# Patient Record
Sex: MALE | Race: WHITE
[De-identification: names, ages, dates, MRNs, and addresses within clinical notes are randomized per-mention and may not be internally consistent; named-entity substitution may affect disease eponyms.]

---

## 2020-03-11 ENCOUNTER — HOSPITAL ENCOUNTER (INPATIENT)
Dept: HOSPITAL 97 - 4TH | Age: 70
LOS: 15 days | Discharge: HOME HEALTH | DRG: 871 | End: 2020-03-26
Attending: INTERNAL MEDICINE | Admitting: INTERNAL MEDICINE
Payer: COMMERCIAL

## 2020-03-11 VITALS — BODY MASS INDEX: 45.6 KG/M2

## 2020-03-11 DIAGNOSIS — E87.1: ICD-10-CM

## 2020-03-11 DIAGNOSIS — E83.42: ICD-10-CM

## 2020-03-11 DIAGNOSIS — Z79.52: ICD-10-CM

## 2020-03-11 DIAGNOSIS — Z79.899: ICD-10-CM

## 2020-03-11 DIAGNOSIS — C67.9: ICD-10-CM

## 2020-03-11 DIAGNOSIS — Z92.3: ICD-10-CM

## 2020-03-11 DIAGNOSIS — D62: ICD-10-CM

## 2020-03-11 DIAGNOSIS — A41.9: Primary | ICD-10-CM

## 2020-03-11 DIAGNOSIS — N18.3: ICD-10-CM

## 2020-03-11 DIAGNOSIS — E87.6: ICD-10-CM

## 2020-03-11 DIAGNOSIS — I13.10: ICD-10-CM

## 2020-03-11 DIAGNOSIS — N39.0: ICD-10-CM

## 2020-03-11 DIAGNOSIS — B95.62: ICD-10-CM

## 2020-03-11 DIAGNOSIS — E78.2: ICD-10-CM

## 2020-03-11 DIAGNOSIS — L03.116: ICD-10-CM

## 2020-03-11 DIAGNOSIS — E66.01: ICD-10-CM

## 2020-03-11 DIAGNOSIS — R65.20: ICD-10-CM

## 2020-03-11 DIAGNOSIS — N17.0: ICD-10-CM

## 2020-03-11 DIAGNOSIS — M86.9: ICD-10-CM

## 2020-03-11 DIAGNOSIS — C61: ICD-10-CM

## 2020-03-11 DIAGNOSIS — I48.0: ICD-10-CM

## 2020-03-11 DIAGNOSIS — R00.0: ICD-10-CM

## 2020-03-11 LAB
ALBUMIN SERPL BCP-MCNC: 3 G/DL (ref 3.4–5)
ALP SERPL-CCNC: 77 U/L (ref 45–117)
ALT SERPL W P-5'-P-CCNC: 28 U/L (ref 12–78)
AST SERPL W P-5'-P-CCNC: 29 U/L (ref 15–37)
BLD SMEAR INTERP: (no result)
BUN BLD-MCNC: 33 MG/DL (ref 7–18)
CKMB CREATINE KINASE MB: 1.2 NG/ML (ref 0.3–3.6)
GLUCOSE SERPLBLD-MCNC: 100 MG/DL (ref 74–106)
HCT VFR BLD CALC: 35.1 % (ref 39.6–49)
LYMPHOCYTES # SPEC AUTO: 0.4 K/UL (ref 0.7–4.9)
MAGNESIUM SERPL-MCNC: 2 MG/DL (ref 1.8–2.4)
MORPHOLOGY BLD-IMP: (no result)
PMV BLD: 7.6 FL (ref 7.6–11.3)
POTASSIUM SERPL-SCNC: 3.1 MMOL/L (ref 3.5–5.1)
RBC # BLD: 3.84 M/UL (ref 4.33–5.43)
TROPONIN I: 0.03 NG/ML (ref 0–0.04)

## 2020-03-11 PROCEDURE — 87340 HEPATITIS B SURFACE AG IA: CPT

## 2020-03-11 PROCEDURE — 86430 RHEUMATOID FACTOR TEST QUAL: CPT

## 2020-03-11 PROCEDURE — 86317 IMMUNOASSAY INFECTIOUS AGENT: CPT

## 2020-03-11 PROCEDURE — 83735 ASSAY OF MAGNESIUM: CPT

## 2020-03-11 PROCEDURE — 87077 CULTURE AEROBIC IDENTIFY: CPT

## 2020-03-11 PROCEDURE — 86021 WBC ANTIBODY IDENTIFICATION: CPT

## 2020-03-11 PROCEDURE — 86803 HEPATITIS C AB TEST: CPT

## 2020-03-11 PROCEDURE — 85014 HEMATOCRIT: CPT

## 2020-03-11 PROCEDURE — 84443 ASSAY THYROID STIM HORMONE: CPT

## 2020-03-11 PROCEDURE — 84145 PROCALCITONIN (PCT): CPT

## 2020-03-11 PROCEDURE — 84132 ASSAY OF SERUM POTASSIUM: CPT

## 2020-03-11 PROCEDURE — 71045 X-RAY EXAM CHEST 1 VIEW: CPT

## 2020-03-11 PROCEDURE — 93306 TTE W/DOPPLER COMPLETE: CPT

## 2020-03-11 PROCEDURE — 86038 ANTINUCLEAR ANTIBODIES: CPT

## 2020-03-11 PROCEDURE — 87075 CULTR BACTERIA EXCEPT BLOOD: CPT

## 2020-03-11 PROCEDURE — 84100 ASSAY OF PHOSPHORUS: CPT

## 2020-03-11 PROCEDURE — 36415 COLL VENOUS BLD VENIPUNCTURE: CPT

## 2020-03-11 PROCEDURE — 81001 URINALYSIS AUTO W/SCOPE: CPT

## 2020-03-11 PROCEDURE — 84244 ASSAY OF RENIN: CPT

## 2020-03-11 PROCEDURE — 82274 ASSAY TEST FOR BLOOD FECAL: CPT

## 2020-03-11 PROCEDURE — 93970 EXTREMITY STUDY: CPT

## 2020-03-11 PROCEDURE — 88304 TISSUE EXAM BY PATHOLOGIST: CPT

## 2020-03-11 PROCEDURE — 84156 ASSAY OF PROTEIN URINE: CPT

## 2020-03-11 PROCEDURE — 82533 TOTAL CORTISOL: CPT

## 2020-03-11 PROCEDURE — 86160 COMPLEMENT ANTIGEN: CPT

## 2020-03-11 PROCEDURE — 87389 HIV-1 AG W/HIV-1&-2 AB AG IA: CPT

## 2020-03-11 PROCEDURE — 82570 ASSAY OF URINE CREATININE: CPT

## 2020-03-11 PROCEDURE — 85018 HEMOGLOBIN: CPT

## 2020-03-11 PROCEDURE — 97116 GAIT TRAINING THERAPY: CPT

## 2020-03-11 PROCEDURE — 71046 X-RAY EXAM CHEST 2 VIEWS: CPT

## 2020-03-11 PROCEDURE — 86705 HEP B CORE ANTIBODY IGM: CPT

## 2020-03-11 PROCEDURE — 84484 ASSAY OF TROPONIN QUANT: CPT

## 2020-03-11 PROCEDURE — 83605 ASSAY OF LACTIC ACID: CPT

## 2020-03-11 PROCEDURE — 87086 URINE CULTURE/COLONY COUNT: CPT

## 2020-03-11 PROCEDURE — 82550 ASSAY OF CK (CPK): CPT

## 2020-03-11 PROCEDURE — 84550 ASSAY OF BLOOD/URIC ACID: CPT

## 2020-03-11 PROCEDURE — 76770 US EXAM ABDO BACK WALL COMP: CPT

## 2020-03-11 PROCEDURE — 80048 BASIC METABOLIC PNL TOTAL CA: CPT

## 2020-03-11 PROCEDURE — 97110 THERAPEUTIC EXERCISES: CPT

## 2020-03-11 PROCEDURE — 97161 PT EVAL LOW COMPLEX 20 MIN: CPT

## 2020-03-11 PROCEDURE — 87804 INFLUENZA ASSAY W/OPTIC: CPT

## 2020-03-11 PROCEDURE — 84165 PROTEIN E-PHORESIS SERUM: CPT

## 2020-03-11 PROCEDURE — 82553 CREATINE MB FRACTION: CPT

## 2020-03-11 PROCEDURE — 82088 ASSAY OF ALDOSTERONE: CPT

## 2020-03-11 PROCEDURE — 76857 US EXAM PELVIC LIMITED: CPT

## 2020-03-11 PROCEDURE — 97530 THERAPEUTIC ACTIVITIES: CPT

## 2020-03-11 PROCEDURE — 87186 SC STD MICRODIL/AGAR DIL: CPT

## 2020-03-11 PROCEDURE — 83520 IMMUNOASSAY QUANT NOS NONAB: CPT

## 2020-03-11 PROCEDURE — 86706 HEP B SURFACE ANTIBODY: CPT

## 2020-03-11 PROCEDURE — 93005 ELECTROCARDIOGRAM TRACING: CPT

## 2020-03-11 PROCEDURE — 87070 CULTURE OTHR SPECIMN AEROBIC: CPT

## 2020-03-11 PROCEDURE — 80053 COMPREHEN METABOLIC PANEL: CPT

## 2020-03-11 PROCEDURE — 87088 URINE BACTERIA CULTURE: CPT

## 2020-03-11 PROCEDURE — 87040 BLOOD CULTURE FOR BACTERIA: CPT

## 2020-03-11 PROCEDURE — 87205 SMEAR GRAM STAIN: CPT

## 2020-03-11 PROCEDURE — 80202 ASSAY OF VANCOMYCIN: CPT

## 2020-03-11 PROCEDURE — 85025 COMPLETE CBC W/AUTO DIFF WBC: CPT

## 2020-03-11 PROCEDURE — 36569 INSJ PICC 5 YR+ W/O IMAGING: CPT

## 2020-03-11 RX ADMIN — CEFTRIAXONE SCH MLS: 1 INJECTION, SOLUTION INTRAVENOUS at 22:15

## 2020-03-11 RX ADMIN — ACETAMINOPHEN PRN MG: 500 TABLET, FILM COATED ORAL at 22:35

## 2020-03-11 NOTE — RAD REPORT
EXAM DESCRIPTION:  Rosa Horta (2 Views)3/11/2020 9:57 pm

 

CLINICAL HISTORY:  Sepsis

 

COMPARISON:  None

 

FINDINGS:   The lungs appear clear of acute infiltrate. The heart is borderline enlarged

 

IMPRESSION:   No acute abnormalities displayed

## 2020-03-12 LAB
BUN BLD-MCNC: 37 MG/DL (ref 7–18)
GLUCOSE SERPLBLD-MCNC: 93 MG/DL (ref 74–106)
HCT VFR BLD CALC: 30.2 % (ref 39.6–49)
LYMPHOCYTES # SPEC AUTO: 0.3 K/UL (ref 0.7–4.9)
MAGNESIUM SERPL-MCNC: 1.8 MG/DL (ref 1.8–2.4)
PMV BLD: 7.9 FL (ref 7.6–11.3)
POTASSIUM SERPL-SCNC: 2.7 MMOL/L (ref 3.5–5.1)
RBC # BLD: 3.31 M/UL (ref 4.33–5.43)
UA COMPLETE W REFLEX CULTURE PNL UR: (no result)
URINE COARSE GRANULAR CASTS: (no result) /LPF
URINE UROTHELIAL CELLS: (no result) /HPF

## 2020-03-12 RX ADMIN — SODIUM CHLORIDE SCH MLS: 0.9 INJECTION, SOLUTION INTRAVENOUS at 18:41

## 2020-03-12 RX ADMIN — POTASSIUM CHLORIDE SCH MLS: 200 INJECTION, SOLUTION INTRAVENOUS at 11:11

## 2020-03-12 RX ADMIN — CEFTRIAXONE SCH MLS: 1 INJECTION, SOLUTION INTRAVENOUS at 11:05

## 2020-03-12 RX ADMIN — DOXYCYCLINE SCH MLS: 100 INJECTION, POWDER, LYOPHILIZED, FOR SOLUTION INTRAVENOUS at 09:00

## 2020-03-12 RX ADMIN — HYDROCODONE BITARTRATE AND ACETAMINOPHEN PRN TAB: 5; 325 TABLET ORAL at 16:22

## 2020-03-12 RX ADMIN — ACETAMINOPHEN PRN MG: 500 TABLET, FILM COATED ORAL at 04:44

## 2020-03-12 RX ADMIN — HYDROCODONE BITARTRATE AND ACETAMINOPHEN PRN TAB: 5; 325 TABLET ORAL at 20:45

## 2020-03-12 RX ADMIN — SODIUM CHLORIDE SCH: 0.9 INJECTION, SOLUTION INTRAVENOUS at 08:00

## 2020-03-12 RX ADMIN — POTASSIUM CHLORIDE SCH MLS: 200 INJECTION, SOLUTION INTRAVENOUS at 11:05

## 2020-03-12 RX ADMIN — SODIUM CHLORIDE SCH MLS: 0.9 INJECTION, SOLUTION INTRAVENOUS at 00:07

## 2020-03-12 RX ADMIN — POTASSIUM CHLORIDE SCH MLS: 200 INJECTION, SOLUTION INTRAVENOUS at 16:22

## 2020-03-12 RX ADMIN — ENOXAPARIN SODIUM SCH MG: 30 INJECTION SUBCUTANEOUS at 18:41

## 2020-03-12 RX ADMIN — CEFTRIAXONE SCH MLS: 1 INJECTION, SOLUTION INTRAVENOUS at 22:07

## 2020-03-12 RX ADMIN — HYDROCODONE BITARTRATE AND ACETAMINOPHEN PRN TAB: 5; 325 TABLET ORAL at 11:10

## 2020-03-12 RX ADMIN — DOXYCYCLINE SCH MLS: 100 INJECTION, POWDER, LYOPHILIZED, FOR SOLUTION INTRAVENOUS at 22:07

## 2020-03-12 RX ADMIN — ACETAMINOPHEN PRN MG: 500 TABLET, FILM COATED ORAL at 23:45

## 2020-03-12 RX ADMIN — SODIUM CHLORIDE SCH MLS: 0.9 INJECTION, SOLUTION INTRAVENOUS at 22:07

## 2020-03-12 RX ADMIN — SODIUM CHLORIDE SCH MLS: 0.9 INJECTION, SOLUTION INTRAVENOUS at 04:47

## 2020-03-12 NOTE — HP
Date of Admission:  2020



Chief Complaint:  Fever, chills, feeling weak.



History Of Present Illness:  This is a 69-year-old pleasant male patient who 
takes multiple antihypertensive medications, has chronic leg edema.  He called 
office today as he was not feeling good at all and reported that his 
temperature was 102.3, so he was asked to come and see me.  He denies any cough
, cold, congestion, not coughing up any mucus except says that yesterday he had 
runny nose for about 4-5 hours.  He started to have this fever as of Friday of 
last week and his maximum temperature was 102.3 degree Fahrenheit last night.  
He also reports that in the last few days, his left leg swelling has gotten 
worse and it is more than usual.  He has history of prostate cancer and he had 
radiation therapy for prostate cancer and says that ever since that time, this 
was few years ago, he has occasional burning sensation on urination, but that 
has gotten worse in last few days.  Denies any abdominal pain, nausea, 
vomiting.  No constipation.  No diarrhea.  No shortness of breath.  Today, he 
started to have profuse sweating.  When he came to office when I examined him, 
he appeared extremely pale.  His appetite is poor and has generalized weakness.
  After I evaluated him, he was admitted to the hospital and I was concerned 
about possibility of sepsis.



Allergies:  NO KNOWN ALLERGIES.



Medications:  Amlodipine 5 mg 2 times a day, aspirin 81 mg daily, clonidine 0.3 
mg 3 times a day, hydralazine 50 mg p.o. 2 times a day, hydrochlorothiazide 
12.5 mg p.o. daily, lisinopril 20 mg p.o. 2 times a day, metoprolol tartrate 
100 mg p.o. 2 times a day, tamsulosin 0.4 mg p.o. daily.



Review of Systems:

Constitutional:  As mentioned above. 

Genitourinary:  As mentioned above. 

Musculoskeletal:  Chronic knee joint pain, unchanged. 

Dermatology:  As mentioned above. 

All other systems reviewed and negative.



Past Medical History:  Significant for goiter, hypertension, mixed 
hyperlipidemia, prostate cancer, erectile dysfunction, lymphedema of legs, 
osteoarthritis.



Past Surgical History:  Partial thyroidectomy in , due to goiter and this 
was in form of removal of left thyroid lobe.



Family History:  Father , had coronary artery disease, hypertension.  
Mother , had Parkinson disease.



Social History:  Negative for smoking.  Occasional use of alcohol.



Physical Examination:

Vital Signs:  Blood pressure was 99/67 at office, repeat blood pressure was 100/
60, checked manually; pulse 83; temperature 98.2.  Patient had taken Tylenol 
and Motrin today for his fever.  Respiratory rate 15, weight 328 pounds, height 
71 inches. 

General:  Patient is awake, alert, and oriented, not in distress, appears very 
weak and pale. 

HEENT:  Head atraumatic, normocephalic.  Conjunctivae nonerythematous.  Sclerae 
white.  Mouth, no thrush or edema noted.  Ears/Nose, no mass, lesion, discharge 
noted. 

Neck:  Supple. No JVD, lymph nodes, bruit, thyromegaly noted. 

Lungs:  Bilateral good equal air entry. Clear to auscultation. No rhonchi.  No 
rales. 

Heart:  Normal heart sounds, no murmur or gallop. 

Abdomen:  Soft, bowel sounds normal. No guarding, rigidity, tenderness, mass, 
hepatosplenomegaly, distention, or bruit noted. 

Extremities:  Presence of bilateral leg edema. 

Skin:  Skin examination of the left leg between knee and foot shows pink, warm 
skin, has some superficial ulceration without any bleeding or discharge. 

Lymphatics:  No lymph node enlargement in neck, supraclavicular, 
infraclavicular region. 

Neuro:  No focal neurological deficit. 

Chest:  Unremarkable. 

External Genitalia:  Deferred. 

Rectal:  Deferred.



Laboratory Data:  Chest x-ray no acute changes.  Urinalysis shows leukocyte 
trace, nitrite negative, WBC <5, bacteria >50, protein 1+.  Lab shows WBC 10.2, 
hemoglobin 11.9, platelets 181, sodium 137, potassium 3.1, chloride 101, bicarb 
27, glucose 100, BUN 33, creatinine 2.87, liver function tests unremarkable, 
procalcitonin 11.08, lactate 1.7.



Impression:  

1.   Sepsis, rule out acute pyelonephritis.

2.   Cellulitis, left leg.

3.   Acute kidney failure.

4.   Mixed hyperlipidemia.

5.   Prostate cancer.

6.   Morbid obesity.

7.   Lymphedema, legs.

8.   Hypertension.



Plan:  Admit patient to hospital for further evaluation and management of this 
problem.  We will go ahead and admit him as an inpatient.  He is appropriate 
for inpatient and is expected to spend 2 midnights in hospital.  Blood culture 
was done.  We will follow up on blood culture and urine culture and we will 
also follow up on influenza test.  1 L of IV fluid bolus will be given soon as 
he gets admitted, IV antibiotics ceftriaxone will be given.  We will hold 
antihypertensive medication at this point.  We will start DVT prophylaxis per 
order using Lovenox.  Details and plan of treatment discussed with the patient.





MARY/KEVIN

DD:  2020 20:39:35   Voice ID:  [id number]

[email or web address]

## 2020-03-13 LAB
BUN BLD-MCNC: 49 MG/DL (ref 7–18)
GLUCOSE SERPLBLD-MCNC: 102 MG/DL (ref 74–106)
HCT VFR BLD CALC: 31.6 % (ref 39.6–49)
LYMPHOCYTES # SPEC AUTO: 0.5 K/UL (ref 0.7–4.9)
MORPHOLOGY BLD-IMP: (no result)
PMV BLD: 8 FL (ref 7.6–11.3)
POTASSIUM SERPL-SCNC: 3.1 MMOL/L (ref 3.5–5.1)
RBC # BLD: 3.47 M/UL (ref 4.33–5.43)

## 2020-03-13 PROCEDURE — 02HV33Z INSERTION OF INFUSION DEVICE INTO SUPERIOR VENA CAVA, PERCUTANEOUS APPROACH: ICD-10-PCS

## 2020-03-13 RX ADMIN — DOXYCYCLINE SCH MLS: 100 INJECTION, POWDER, LYOPHILIZED, FOR SOLUTION INTRAVENOUS at 21:19

## 2020-03-13 RX ADMIN — HYDROCODONE BITARTRATE AND ACETAMINOPHEN PRN TAB: 5; 325 TABLET ORAL at 05:23

## 2020-03-13 RX ADMIN — ENOXAPARIN SODIUM SCH MG: 30 INJECTION SUBCUTANEOUS at 16:05

## 2020-03-13 RX ADMIN — SODIUM CHLORIDE SCH MLS: 0.9 INJECTION, SOLUTION INTRAVENOUS at 21:19

## 2020-03-13 RX ADMIN — SODIUM CHLORIDE SCH MLS: 9 INJECTION, SOLUTION INTRAVENOUS at 12:38

## 2020-03-13 RX ADMIN — DOXYCYCLINE SCH MLS: 100 INJECTION, POWDER, LYOPHILIZED, FOR SOLUTION INTRAVENOUS at 08:37

## 2020-03-13 RX ADMIN — HYDROCODONE BITARTRATE AND ACETAMINOPHEN PRN TAB: 5; 325 TABLET ORAL at 14:34

## 2020-03-13 RX ADMIN — ACETAMINOPHEN PRN MG: 500 TABLET, FILM COATED ORAL at 22:21

## 2020-03-13 RX ADMIN — SODIUM CHLORIDE SCH: 0.9 INJECTION, SOLUTION INTRAVENOUS at 12:00

## 2020-03-13 RX ADMIN — HYDROCODONE BITARTRATE AND ACETAMINOPHEN PRN TAB: 5; 325 TABLET ORAL at 21:44

## 2020-03-13 RX ADMIN — TAMSULOSIN HYDROCHLORIDE SCH MG: 0.4 CAPSULE ORAL at 10:41

## 2020-03-13 NOTE — RAD REPORT
EXAM DESCRIPTION:  US - Renal Ultrasound-Complete - 3/13/2020 11:06 am

 

CLINICAL HISTORY:  . Acute renal failure

 

COMPARISON:  None.

 

FINDINGS:  The right kidney measures 12 cm with a normal echotexture.

 

The left kidney measures 14 cm with a normal echotexture. 3.3 centimeters cyst

 

Hydronephrosis is not seen.

 

IMPRESSION:  3.3 centimeter left renal cyst

## 2020-03-13 NOTE — RAD REPORT
EXAM DESCRIPTION:  US - Urinary Bladder - 3/13/2020 11:07 am

 

CLINICAL HISTORY:  Acute renal insufficiency

 

FINDINGS:  Prevoid bladder volume equals 42 cc

 

Postvoid bladder volume equals 7 cc

 

No ascites

 

IMPRESSION:  Prevoid bladder volume equals 42 cc

 

Postvoid bladder volume equals 7 cc

## 2020-03-13 NOTE — PN
Date of Progress Note:  03/13/2020



Subjective:  Patient was seen this morning for followup.  He was sitting at bedside in chair.  Overal
l, he looks a lot better today than yesterday and day before yesterday.  He had lot of leg pain yeste
rday in the left leg, but that has improved.  Denies any abdominal pain, nausea, vomiting.  No shortn
ess of breath.  Appetite has improved.



Objective:  Vital Signs:  Reviewed.  Blood pressure actually has improved now. 

HEENT:  Unremarkable. 

Lungs:  Clear to auscultation. 

Heart:  Heart sounds normal. 

Abdomen:  Soft, bowel sounds normal.  No guarding, rigidity, tenderness, or distention. 

Extremities:  Left leg has about grade 3 pedal edema.  Edema of the left leg has gotten worse compare
d to yesterday and cellulitis changes also has gotten worse today compared to yesterday and day befor
e yesterday.  Today, his left medial thigh almost entire left medial thigh has pink warm skin, which 
is new.  His intensity of redness of skin in the area between left knee and foot is lot worse today a
lso involving left foot.  There is some clear liquid type of discharge from the left dorsum foot and 
nurse was advised to go ahead and clean that area and send it for the culture.



Laboratory Data:  Today's blood work results reviewed.  White count is normal.  His procalcitonin is 
down to 4.  Yesterday, it was 6, day before yesterday it was 11.  Creatinine has gone up today to 3.4
 range, from the time of admission, which was day before yesterday.  Until yesterday morning, there w
as improvement and between yesterday morning and today creatinine has gone up.  His procalcitonin has
 steadily declined since the time of admission.



Impression:  

1.Cellulitis, left leg.

2.Acute kidney failure.

3.Rule out sepsis.

4.Hypertension.



Plan:  We will go ahead and discontinue ceftriaxone and doxycycline and start the patient on Levaquin
 as well as vancomycin as per order.  Consult pharmacy for vancomycin dose management.  IV fluid was 
ordered.  Patient was getting IV fluid at 100 mL/hour and 500 mL bolus was ordered and then we will c
ontinue IV fluid at 100 mL/hour.  Kidney and bladder ultrasound was ordered to be done this morning. 
 Results reviewed and it is normal.  Nephrology consultation was also ordered and nephrologist has se
en the patient and I have discussed details with the nephrologist.  I expect his renal function to im
prove hopefully starting tomorrow or day after tomorrow we might able to reach peak level today.  His
 renal failure I strongly suspect is result of probable acute kidney injury from hypotension and seps
is type of problem and all the details were discussed with the patient.  No need for any antihyperten
sive medication at this point yet.  PICC line was ordered as the patient is having poor IV access.  W
e will repeat blood work tomorrow morning.  Ambulation was encouraged.  Patient was advised to keep h
is leg elevated and continue DVT prophylaxis with Lovenox.





MARY/MODL

DD:  03/13/2020 20:02:11Voice ID:  [id number]

DT:  03/13/2020 22:17:04Report ID:  [id number]

## 2020-03-13 NOTE — RAD REPORT
EXAM DESCRIPTION:  Rosa Single View3/13/2020 11:23 am

 

CLINICAL HISTORY:  Chest pain

 

COMPARISON:  March 11, 2020

 

FINDINGS:   The lungs appear clear of acute infiltrate. The heart is mildly to moderately enlarged

 

IMPRESSION:   No acute abnormalities displayed

## 2020-03-13 NOTE — P.CNS
Date of Consult: 20


Reason for Consult: jacinto, hypokalmemia 


Requesting Physician: Elieser Hansen


Chief Complaint: fever, weakness


Allergies





No Known Allergies Allergy (Unverified 20 19:59)


 





Home Medications: 








Abiraterone Acetate [Zytiga] 1,000 mg PO DAILY 20 


Amlodipine Besylate 1 tab PO BID 20 


Ascorbate Calcium [Vitamin C] 1 tab PO DAILY 20 


Aspirin [Aspirin EC 81 MG] 1 tab PO BEDTIME 20 


Ca/D3/Mag Ox/Zinc//Tyler/Bor [Calcium 600-D3 Plus Caplet] 1 tab PO DAILY  


Cholecalciferol (Vitamin D3) [Vitamin D3] 1 cap PO DAILY 20 


Clonidine HCl [Catapres] 1 tab PO TID 20 


Hydralazine [Apresoline*] 25 mg PO BID 20 


Iron,Carb/Vit C/Vit B12/Folic [Iron 100 Plus Tablet] 1 tab PO DAILY 20 


Lisinopril [Zestril] 1 tab PO BID 20 


Metoprolol Tartrate 50 mg PO DAILY 20 


Montelukast Sodium 10 mg PO PRN 20 


Tamsulosin HCl 1 tab PO DAILY 20 


Ubidecarenone [Co Q10] 200 mg PO DAILY 20 


hydroCHLOROthiazide [Hydrochlorothiazide] 12.5 mg PO BID 20 


predniSONE [Prednisone*] 5 mg PO BID 20 








- Past Medical/Surgical History


Diabetic: No


-: Hypertension


-: Prostate CA, Radiation 45 days last Dec 18,2019


-: thyroidectomy





- Family History


  ** Father


Medical History: Heart disease





  ** Mother


Medical History: Other (see notes)


Notes: parkinson





  ** Brother


Medical History: Other (see notes)


Notes: sepsis





- Social History


Alcohol use: Yes


CD- Drugs: No


Caffeine use: Yes


Place of Residence: Home





Physical Examination














Temp Pulse Resp BP Pulse Ox


 


 97.3 F   106 H  16   134/84   98 


 


 20 08:00  20 08:00  20 08:00  20 08:00  20 08:00








Laboratory Data (last 24 hrs)





20 07:17: WBC 10.2  D, Hgb 10.7 L, Hct 31.6 L, Plt Count 150 L


20 05:14: Magnesium 1.9


20 05:14: Sodium 137, Potassium 3.1 L, BUN 49 H, Creatinine 3.42 H, 

Glucose 102


20 : Potassium Cancelled


20 21:31: Potassium 3.1 L








- Problems


(1) JACINTO (acute kidney injury)


Current Visit: Yes   Status: Acute   


Conclusions/Impression: 








History Of Present Illness:  


A 69-year-old with PMHx of HTN , prostate CA on Leupron? was on radiation 

therapy until 2019 and started last month on Zytiga


pt was sent fro fever and chills 


pt was started on Zytiga in med February


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 

cystitis


had runny nose for 3-4 hrs , with mild nausea and no vomiting 


he denied SOB , cough, diarrhea, chest pain, palpitation ,  


pt was taking Ibuprofen 3-4 times daily last 3-4 days








Allergies:  NO KNOWN ALLERGIES.





Medications:  Amlodipine 5 mg 2 times a day, aspirin 81 mg daily, clonidine 0.3 

mg 3 times a day, hydralazine 50 mg p.o. 2 times a day, hydrochlorothiazide 

12.5 mg p.o. daily, lisinopril 20 mg p.o. 2 times a day, metoprolol tartrate 

100 mg p.o. 2 times a day, tamsulosin 0.4 mg p.o. daily.





Review of Systems:


as in HPI 





Past Medical History:  


as in HPI 





Past Surgical History:  Partial thyroidectomy in , due to goiter and this 

was in form of removal of left thyroid lobe.





Family History:  Father , had coronary artery disease, hypertension.  

Mother , had Parkinson disease.





Social History:  Negative for smoking.  Occasional use of alcohol.

















 Physical exam 


general: AAOX3, NAD , obese


Neck; Supple, No elevated JVD 


hear: RRR, normal S1,2 no murmur or rub


Chest: CTAB, no rales or wheezes  


Abdomen: Soft , Nt 


Extremities Lt leg edema and erythema , with Lt leg trace edema 

















A/p 








JACINTO 


Cr ~1.0 at baseline 


possibly due to septic ATn vs Zytiga 


will cont IVF 


renal US no hydro 


will cont IV for now 


cont to hold HCTZ


will send for serology 


will send for UPC 





HTN 


BP controlled 





Bladder CA 


hold Zytiga 








Sepsis


monitor vanco level 


F/U cultures

## 2020-03-13 NOTE — PN
Date of Progress Note:  03/12/2020



Subjective:  The patient was seen for followup in the morning.  He actually looked better than yester
day.  He is complaining of lot of pain in his left leg and reported that Tylenol was not helping, so 
we did talk about some stronger pain medication and we will order that for him.  No nausea or vomitin
g.  No other new complaints reported overnight.



Objective:  Vital Signs:  Reviewed. 

HEENT:  Unremarkable. 

Lungs:  Clear to auscultation. 

Heart:  Sounds normal. 

Abdomen:  Soft.  Bowel sounds normal.  No guarding, rigidity, tenderness, or distention. 

Extremity:  Left leg redness of the skin between knee and foot is present and unchanged.  Leg is warm
 to touch.  No new findings noted.  Right leg is normal.



Laboratory Data:  White count 8.1, hemoglobin 10.3, platelets 148.  Procalcitonin level today is 6.16
, which is better from yesterday and yesterday it was 11.08.  Sodium 135, potassium 2.7, chloride 103
, bicarb 24, BUN 37, creatinine 2.61, glucose 93, magnesium 1.8.  Creatinine today is better, it is 2
.61.  Yesterday, it was 2.87.



Impression:  

1.Cellulitis, left leg.

2.Rule out sepsis.

3.Acute kidney failure.

4.Hypertension.

5.Hypokalemia.

6.Hyponatremia.



Plan:  We will go ahead and continue IV fluid per order.  We will also continue ceftriaxone that was 
started upon admission and we will add doxycycline.  Blood culture result pending.  Influenza test is
 negative.  The patient's procalcitonin has improved compared to yesterday.  IV fluid will be continu
ed.  We will continue Lovenox for DVT prophylaxis, and hydrocodone was ordered.  We will add doxycycl
ine 

as per order and I will see him tomorrow for followup.  We will not start any antihypertensive medica
tion yet.





MARY/MODL

DD:  03/13/2020 07:38:47Voice ID:  [id number]

DT:  03/13/2020 12:19:33Report ID:  [id number]

## 2020-03-14 LAB
BUN BLD-MCNC: 50 MG/DL (ref 7–18)
CREAT UR-SCNC: 52 MG/DL (ref 20–370)
GLUCOSE SERPLBLD-MCNC: 90 MG/DL (ref 74–106)
POTASSIUM SERPL-SCNC: 3.4 MMOL/L (ref 3.5–5.1)
PROT UR-MCNC: 106 MG/DL (ref ?–11.9)
TSH SERPL DL<=0.05 MIU/L-ACNC: 0.46 UIU/ML (ref 0.36–3.74)
URATE SERPL-MCNC: 5.9 MG/DL (ref 3.5–7.2)

## 2020-03-14 RX ADMIN — METOPROLOL TARTRATE SCH MG: 25 TABLET ORAL at 21:04

## 2020-03-14 RX ADMIN — SODIUM CHLORIDE SCH MLS: 0.9 INJECTION, SOLUTION INTRAVENOUS at 17:08

## 2020-03-14 RX ADMIN — HYDROCODONE BITARTRATE AND ACETAMINOPHEN PRN TAB: 5; 325 TABLET ORAL at 21:11

## 2020-03-14 RX ADMIN — METOPROLOL TARTRATE SCH MG: 25 TABLET ORAL at 09:49

## 2020-03-14 RX ADMIN — ENOXAPARIN SODIUM SCH MG: 30 INJECTION SUBCUTANEOUS at 17:07

## 2020-03-14 RX ADMIN — SODIUM CHLORIDE SCH MLS: 0.9 INJECTION, SOLUTION INTRAVENOUS at 07:32

## 2020-03-14 RX ADMIN — ACETAMINOPHEN PRN MG: 500 TABLET, FILM COATED ORAL at 22:30

## 2020-03-14 RX ADMIN — DOXYCYCLINE SCH MLS: 100 INJECTION, POWDER, LYOPHILIZED, FOR SOLUTION INTRAVENOUS at 08:51

## 2020-03-14 RX ADMIN — SODIUM CHLORIDE SCH MLS: 9 INJECTION, SOLUTION INTRAVENOUS at 22:30

## 2020-03-14 RX ADMIN — TAMSULOSIN HYDROCHLORIDE SCH MG: 0.4 CAPSULE ORAL at 07:26

## 2020-03-14 NOTE — PN
Date of Progress Note:  03/14/2020



Subjective:  Patient was seen this morning for followup.  No new complaints or problems reported by h
im.  Reported that he rested well last night.  Pain in his left leg is better when he has his leg katharine
vated, but notices more pain when he keeps his leg in a dependent position while sitting, standing, w
alking, etc.  Denies any shortness of breath.



Objective:  Vital Signs:  Reviewed. 

HEENT:  Unremarkable. 

Lungs:  Clear to auscultation. 

Heart:  Heart sounds normal. 

Abdomen:  Soft.  Bowel sounds normal.  No guarding, rigidity, tenderness, distention. 

Extremities:  Right leg, no edema.  Left leg has grade 3 pedal edema, which is unchanged from yesterd
ay.  Redness of skin of left thigh and left leg between knee and toes remain unchanged today and swel
ling remains unchanged today.  There are some superficial open areas on the lower and lateral leg and
 dorsum foot remains unchanged.



Laboratory Data:  Sodium 139, potassium 3.4, chloride 106, bicarb 24, BUN 50, creatinine 3.60, glucos
e 90.  Blood culture remains negative.



Impression:  

1.Acute kidney injury.

2.Cellulitis, left leg.

3.Hypertension.

4.Sinus tachycardia.



Plan:  Patient's heart rate remains in 120 to 130 range lot of times and on telemetry monitor it is s
inus tachycardia.  No evidence of atrial fibrillation.  We will restart his antihypertensive medicati
on, metoprolol that he normally takes at home and we will start it at 25 mg twice a day dose.  His cr
eatinine when he came in was 2.8 and it came down to 2.6, then yesterday went up to 3.4, and today it
 is 3.6.  Increase in creatinine in the last 24 hours is not as bad as previous 24 hours, so I hope t
hat we might have reached a peak level by today.  We will continue current antibiotics, IV fluids, re
peat blood work tomorrow, continue to follow with nephrologist, and I will see him tomorrow for follo
wup.  Continue Lovenox and prednisone per order.  Patient reported that he is on prednisone for the l
ast 3 weeks along with his chemotherapy medication started by Dr. Green.





MARY/MODL

DD:  03/14/2020 09:21:49Voice ID:  [id number]

DT:  03/14/2020 13:25:48Report ID:  [id number]

## 2020-03-14 NOTE — P.PN
Subjective


Date of Service: 20


Chief Complaint: fever, weakness





Subjective 


A 69-year-old with PMHx of HTN , prostate CA  was on radiation therapy until 2019 and started last month on Zytiga


pt was sent fro fever and chills 


pt was started on Zytiga in med February


in Er cr 2.6 and elevated to 3.6 








today 


feels better 


have tachycardia , BB restarted 


Cr slightly elevate d to 3.8, plateauing?


cont IVF for now 








Allergies:  NO KNOWN ALLERGIES.





Medications:  Amlodipine 5 mg 2 times a day, aspirin 81 mg daily, clonidine 0.3 

mg 3 times a day, hydralazine 50 mg p.o. 2 times a day, hydrochlorothiazide 

12.5 mg p.o. daily, lisinopril 20 mg p.o. 2 times a day, metoprolol tartrate 

100 mg p.o. 2 times a day, tamsulosin 0.4 mg p.o. daily.





Review of Systems:


as in HPI 





Past Medical History:  


as in HPI 





Past Surgical History:  Partial thyroidectomy in , due to goiter and this 

was in form of removal of left thyroid lobe.





Family History:  Father , had coronary artery disease, hypertension.  

Mother , had Parkinson disease.





Social History:  Negative for smoking.  Occasional use of alcohol.

















 Physical exam 


general: AAOX3, NAD , obese


Neck; Supple, No elevated JVD 


hear: tachycardia, regular rhythm  normal S1,2 no murmur or rub


Chest: CTAB, no rales or wheezes  


Abdomen: Soft , Nt 


Extremities Lt leg edema and erythema , with Lt leg trace edema 

















A/p 








JACINTO 


Cr ~1.0 at baseline


possibly due to septic ATn vs Zytiga 


will cont IVF 


renal US no hydro 


will cont IV for now 


cont to hold HCTZ


UPC 2.0 


F/U serology 





HTN 


BP controlled 





Bladder CA 


hold Zytiga 








Sepsis


monitor vanco level 


F/U cultures 

















Physical Examination





- Vital Signs


Temperature: 99 F


Blood Pressure: 123/68


Pulse: 125


Respirations: 16


Pulse Ox (%): 94





- Studies


Laboratory Data (last 24 hrs)





20 15:23: Potassium 3.7


20 13:00: Potassium Cancelled


20 07:15: Uric Acid 5.9, Phosphorus 2.8


20 05:47: Sodium 139, Potassium 3.4 L, BUN 50 H, Creatinine 3.60 H, 

Glucose 90





Microbiology Data (last 24 hrs): 








20 13:45   Wound - Left Foot   Gram Stain - Final


20 08:14   Blood  - Blood   Anaerobic Blood Culture - Final


20 22:10   Clean Catch Urine   [place] Count - Final


                            <10,000 CFU/ML.


20 22:10   Clean Catch Urine    - Final


                            MIXED [place].








Assessment And Plan





- Current Problems (Diagnosis)


(1) JACINTO (acute kidney injury)


Current Visit: Yes   Status: Acute

## 2020-03-15 LAB
BUN BLD-MCNC: 51 MG/DL (ref 7–18)
GLUCOSE SERPLBLD-MCNC: 97 MG/DL (ref 74–106)
HCT VFR BLD CALC: 26.7 % (ref 39.6–49)
LYMPHOCYTES # SPEC AUTO: 0.5 K/UL (ref 0.7–4.9)
MAGNESIUM SERPL-MCNC: 1.5 MG/DL (ref 1.8–2.4)
MAGNESIUM SERPL-MCNC: 2 MG/DL (ref 1.8–2.4)
PMV BLD: 8.2 FL (ref 7.6–11.3)
POTASSIUM SERPL-SCNC: 3.1 MMOL/L (ref 3.5–5.1)
POTASSIUM SERPL-SCNC: 3.8 MMOL/L (ref 3.5–5.1)
RBC # BLD: 2.94 M/UL (ref 4.33–5.43)

## 2020-03-15 RX ADMIN — SODIUM CHLORIDE SCH MLS: 0.9 INJECTION, SOLUTION INTRAVENOUS at 16:06

## 2020-03-15 RX ADMIN — ENOXAPARIN SODIUM SCH MG: 30 INJECTION SUBCUTANEOUS at 16:06

## 2020-03-15 RX ADMIN — SODIUM CHLORIDE SCH: 0.9 INJECTION, SOLUTION INTRAVENOUS at 14:00

## 2020-03-15 RX ADMIN — ACETAMINOPHEN PRN MG: 500 TABLET, FILM COATED ORAL at 16:06

## 2020-03-15 RX ADMIN — ACETAMINOPHEN PRN MG: 500 TABLET, FILM COATED ORAL at 02:16

## 2020-03-15 RX ADMIN — SODIUM CHLORIDE SCH MLS: 9 INJECTION, SOLUTION INTRAVENOUS at 21:47

## 2020-03-15 RX ADMIN — TAMSULOSIN HYDROCHLORIDE SCH MG: 0.4 CAPSULE ORAL at 08:06

## 2020-03-15 RX ADMIN — SODIUM CHLORIDE SCH MLS: 9 INJECTION, SOLUTION INTRAVENOUS at 10:27

## 2020-03-15 RX ADMIN — ACETAMINOPHEN PRN MG: 500 TABLET, FILM COATED ORAL at 21:47

## 2020-03-15 RX ADMIN — METOPROLOL TARTRATE SCH MG: 50 TABLET, FILM COATED ORAL at 21:48

## 2020-03-15 RX ADMIN — SODIUM CHLORIDE SCH MLS: 9 INJECTION, SOLUTION INTRAVENOUS at 09:43

## 2020-03-15 RX ADMIN — SODIUM CHLORIDE SCH MLS: 9 INJECTION, SOLUTION INTRAVENOUS at 17:25

## 2020-03-15 RX ADMIN — SODIUM CHLORIDE SCH MLS: 0.9 INJECTION, SOLUTION INTRAVENOUS at 04:35

## 2020-03-15 NOTE — P.PN
Subjective


Date of Service: 03/15/20


Chief Complaint: fever, weakness





Subjective 


A 69-year-old with PMHx of HTN , prostate CA  was on radiation therapy until 2019 and started last month on Zytiga


pt was sent fro fever and chills 


pt was started on Zytiga in med February


in Er cr 2.6 and elevated to 3.6 








today 


feels better , mild tachycardia 


Cr improving to 3.0 , ATN? resolving 


cont IVF 


wbc up to 15K, Abx adjusted by primary team, 


cont IVF for now 


will replace K, Mg replaced 








Allergies:  NO KNOWN ALLERGIES.





Medications:  Amlodipine 5 mg 2 times a day, aspirin 81 mg daily, clonidine 0.3 

mg 3 times a day, hydralazine 50 mg p.o. 2 times a day, hydrochlorothiazide 

12.5 mg p.o. daily, lisinopril 20 mg p.o. 2 times a day, metoprolol tartrate 

100 mg p.o. 2 times a day, tamsulosin 0.4 mg p.o. daily.





Review of Systems:


as in HPI 





Past Medical History:  


as in HPI 





Past Surgical History:  Partial thyroidectomy in , due to goiter and this 

was in form of removal of left thyroid lobe.





Family History:  Father , had coronary artery disease, hypertension.  

Mother , had Parkinson disease.





Social History:  Negative for smoking.  Occasional use of alcohol.

















 Physical exam 


general: AAOX3, NAD , obese


Neck; Supple, No elevated JVD 


hear: tachycardia, regular rhythm  normal S1,2 no murmur or rub


Chest: CTAB, no rales or wheezes  


Abdomen: Soft , Nt 


Extremities Lt leg edema and erythema , with Lt leg trace edema 

















A/p 








JACINTO 


Cr ~1.0 at baseline


possibly due to septic ATn vs Zytiga 


will cont IVF 


renal US no hydro 


will cont IV for now 


cont to hold HCTZ


UPC 2.0 


F/U serology 





HTN 


BP controlled 





Bladder CA 


hold Zytiga 








Sepsis


monitor vanco level 


F/U cultures 








tachycardia 


possibly due to sepsis vs reflex from holding BB


now restarted on BB 

















Physical Examination





- Vital Signs


Temperature: 97.7 F


Blood Pressure: 137/84


Pulse: 101


Respirations: 18


Pulse Ox (%): 98





- Studies


Laboratory Data (last 24 hrs)





03/15/20 03:45: Sodium 140, Potassium 3.1 L, BUN 51 H, Creatinine 3.00 H, 

Glucose 97, Magnesium 1.5 L


03/15/20 03:45: WBC 15.4 H D, Hgb 8.9 L, Hct 26.7 L D, Plt Count 159


20 15:23: Potassium 3.7





Microbiology Data (last 24 hrs): 








20 13:45   Wound - Left Foot   Gram Stain - Final


20 08:14   Blood  - Blood   Anaerobic Blood Culture - Final


20 22:10   Clean Catch Urine   [place] Count - Final


                            <10,000 CFU/ML.


20 22:10   Clean Catch Urine    - Final


                            MIXED [place].








Assessment And Plan





- Current Problems (Diagnosis)


(1) JACINTO (acute kidney injury)


Current Visit: Yes   Status: Acute

## 2020-03-15 NOTE — CON
Date of Consultation:  03/15/2020



Reason For Consultation:  New-onset atrial fibrillation.



History Of Present Illness:  Mr. Hensley is a 69-year-old male.  He is a patient of Dr. Hansen, has multip
le issues, but basically was admitted from Dr. Hansen's office with cellulitis, sepsis, severe lymphede
ma of the left leg, acute kidney failure, dyslipidemia, hypertension, morbid obesity.  He was being t
reated with Lovenox, Levaquin, prednisone, vancomycin, Vibramycin, as well as metoprolol and some of 
his home medication and while he was being treated, he had an episode yesterday of atrial fibrillatio
n with a rapid ventricular response that has resolved on its own.  He is today in normal sinus rhythm
.  He rarely feels any palpitation, but denied any symptoms with his atrial fibrillation.  He denied 
any chest pain, shortness of breath.  He denied any syncope.



Past Medical History:  As stated above.



Allergies:  NONE.



Review of Systems:

Positive for fever 102.3 when he came in.



Social History:  Unremarkable.



Family History:  Positive for coronary artery disease.



Medications:  At home include clonidine, metoprolol, aspirin, hydralazine, prednisone, Flomax, hydroc
hlorothiazide as well as lisinopril and a medicine called Zytiga.



Physical Examination:

Vital Signs:  Stable.  He was afebrile.  He was in a sinus rhythm now. 

HEENT:  Negative. 

Neck:  Supple.  No bruit. 

Chest:  Clear. 

Cardiac:  Revealed a regular rhythm and rate.  No murmurs, gallops, or rubs. 

Abdomen:  Obese, but benign. 

Extremities:  Revealed normal right lower extremity, but the left lower extremity had cellulitis, sev
ere edema, erythema.  His edema extended to his left thigh.



Diagnostic Data:  His creatinine was 3.0.  White count was 15,000, hemoglobin is 8.9.  Procalcitonin 
was 4.32.  EKG now showed normal sinus rhythm with nonspecific changes.



Impression And Plan:  Single episode of atrial fibrillation, now has resolved.  Patient has all the r
isk factors for atrial fibrillation.  He has morbid obesity.  He has hypertension, dyslipidemia.  He 
came in with acute kidney failure, followed by Nephrology.  He is being treated for cellulitis, sepsi
s as well as lymphedema.  I suggest we do not put him on anticoagulation as far as oral anticoagulati
on for now just from having one episode.  I do agree with the Lovenox and the metoprolol along with h
is other medication that he is getting for his infections.  I would like him to get a 2D echocardiogr
am tomorrow.  If the echocardiogram is normal, I would suffice with aspirin and beta-blockers.  It ma
y be worth getting an event monitor on him down the road to see if he is having paroxysmal atrial fib
rillation and we can make an arrangement for that as an outpatient.  I will discuss the case further 
with Dr. Hansen.  His other problems including hypertension and dyslipidemia are well controlled.  His 
kidney function is being followed closely by Nephrology.  I will continue to follow him.





NB/KEVIN

DD:  03/15/2020 10:35:41Voice ID:  [id number]

DT:  03/15/2020 14:39:34Report ID:  [id number]

## 2020-03-15 NOTE — EKG
Test Date:    2020-03-15               Test Time:    03:25:22

Technician:   RT BARBER                                   

                                                     

MEASUREMENT RESULTS:                                       

Intervals:                                           

Rate:         137                                    

PA:                                                  

QRSD:         82                                     

QT:           256                                    

QTc:          386                                    

Axis:                                                

P:                                                   

PA:                                                  

QRS:          31                                     

T:            82                                     

                                                     

INTERPRETIVE STATEMENTS:                                       

                                                     

Atrial fibrillation with rapid ventricular response

Abnormal ECG

No previous ECG available for comparison



Electronically Signed On 03-15-20 09:12:43 CDT by Jamison Myers

## 2020-03-15 NOTE — PN
Date of Progress Note:  03/15/2020



Subjective:  Patient was seen this morning for followup.  He was lying in bed, not in any distress.  
He actually feels better and looked a lot better today than last couple of days.  Vital signs reviewe
d.  During nighttime, he had atrial fibrillation with rapid ventricular rate and he did convert to si
nus rhythm after IV metoprolol, which was given early this morning.  When I saw him this morning, he 
was in sinus rhythm.  He still has lot of left leg pain, especially worse when he sits or stands with
 the leg in the dependent position.



Objective:  Vital Signs:  Reviewed. 

HEENT:  Unremarkable. 

Lungs:  Clear to auscultation. 

Heart:  Sounds normal. 

Abdomen:  Soft.  Bowel sounds normal.  No guarding, rigidity, tenderness, distention. 

Extremities:  Right leg, no edema.  Left leg, edema remains unchanged and redness distribution from t
high and lower leg remains unchanged, but intensity of redness is better today compared to yesterday 
and instead of hard pink appearance of the skin, now it is brownish color in lot of areas.  Distribut
ion remains the same from thigh and lower leg area.



Laboratory Data:  White count has gone up to 15.4 today, hemoglobin 8.9, platelets 159.  Sodium 140, 
potassium 3.1, chloride 109, bicarb 23, BUN 51, creatinine 3, glucose 97, magnesium 1.5.  Procalciton
in today 4.32.



Impression:  

1.Cellulitis, left leg.

2.Acute kidney injury.

3.Hypokalemia.

4.Hypomagnesemia.

5.Anemia.

6.Hypertension.

7.Paroxysmal atrial fibrillation.



Plan:  We will go ahead and replace electrolyte per protocol.  Metoprolol dose was increased to 50 mg
 twice a day.  Cardiology consultation was requested.  We will give anticoagulation therapy per order
.  I have changed antibiotics today and we will give IV meropenem and IV clindamycin per order.  Vanc
omycin will be continued per order.  Renal function is better today.  WBC has gone up today, but proc
alcitonin continues to get better.  All the details were discussed with the patient.  WBC count shoul
d show improvement with some change in antibiotics today, but procalcitonin continues to show improve
ment on a day-to-day basis since admission and renal function has shown improvement now, so we hope t
hat will continue to show improvement as well.





MARY/MODL

DD:  03/15/2020 12:16:07Voice ID:  [id number]

DT:  03/15/2020 16:36:17Report ID:  [id number]

## 2020-03-16 LAB
BUN BLD-MCNC: 51 MG/DL (ref 7–18)
GLUCOSE SERPLBLD-MCNC: 93 MG/DL (ref 74–106)
HCT VFR BLD CALC: 25.8 % (ref 39.6–49)
LYMPHOCYTES # SPEC AUTO: 0.6 K/UL (ref 0.7–4.9)
MAGNESIUM SERPL-MCNC: 1.8 MG/DL (ref 1.8–2.4)
PMV BLD: 7.8 FL (ref 7.6–11.3)
POTASSIUM SERPL-SCNC: 3.7 MMOL/L (ref 3.5–5.1)
RBC # BLD: 2.8 M/UL (ref 4.33–5.43)

## 2020-03-16 RX ADMIN — ENOXAPARIN SODIUM SCH MG: 30 INJECTION SUBCUTANEOUS at 16:57

## 2020-03-16 RX ADMIN — SODIUM CHLORIDE SCH MLS: 0.9 INJECTION, SOLUTION INTRAVENOUS at 14:31

## 2020-03-16 RX ADMIN — SODIUM CHLORIDE SCH: 9 INJECTION, SOLUTION INTRAVENOUS at 22:00

## 2020-03-16 RX ADMIN — SODIUM CHLORIDE SCH MLS: 0.9 INJECTION, SOLUTION INTRAVENOUS at 02:06

## 2020-03-16 RX ADMIN — TAMSULOSIN HYDROCHLORIDE SCH MG: 0.4 CAPSULE ORAL at 08:22

## 2020-03-16 RX ADMIN — SODIUM CHLORIDE SCH MLS: 9 INJECTION, SOLUTION INTRAVENOUS at 11:05

## 2020-03-16 RX ADMIN — CLONIDINE HYDROCHLORIDE SCH MG: 0.3 TABLET ORAL at 20:10

## 2020-03-16 RX ADMIN — METOPROLOL TARTRATE SCH MG: 50 TABLET, FILM COATED ORAL at 20:08

## 2020-03-16 RX ADMIN — SODIUM CHLORIDE SCH MLS: 9 INJECTION, SOLUTION INTRAVENOUS at 16:56

## 2020-03-16 RX ADMIN — METOPROLOL TARTRATE SCH MG: 50 TABLET, FILM COATED ORAL at 08:20

## 2020-03-16 RX ADMIN — ACETAMINOPHEN PRN MG: 500 TABLET, FILM COATED ORAL at 02:03

## 2020-03-16 RX ADMIN — HYDRALAZINE HYDROCHLORIDE SCH MG: 25 TABLET, FILM COATED ORAL at 20:10

## 2020-03-16 RX ADMIN — SODIUM CHLORIDE SCH MLS: 9 INJECTION, SOLUTION INTRAVENOUS at 10:09

## 2020-03-16 RX ADMIN — SODIUM CHLORIDE SCH MLS: 9 INJECTION, SOLUTION INTRAVENOUS at 20:07

## 2020-03-16 RX ADMIN — SODIUM CHLORIDE SCH MLS: 9 INJECTION, SOLUTION INTRAVENOUS at 00:58

## 2020-03-16 RX ADMIN — HYDROCODONE BITARTRATE AND ACETAMINOPHEN PRN TAB: 5; 325 TABLET ORAL at 20:08

## 2020-03-16 NOTE — RAD REPORT
EXAM DESCRIPTION:  US - Extrem Venous W [facility] - 3/16/2020 9:35 am

 

CLINICAL HISTORY:  leg edema

Bilateral leg edema and swelling.

 

COMPARISON:  Extremity Venous [facility] dated 7/3/2018

 

TECHNIQUE:  Real-time sonographic interrogation of the left and right lower extremity deep venous sys
tems was performed.

 

FINDINGS:  Normal compressibility, flow augmentation, phasic flow and spontaneous flow is identified 
in both the left and right lower extremity deep venous systems. 5 cm right Baker's cyst.

 

IMPRESSION:  No sonographic evidence of left or right lower extremity deep venous thrombosis.

## 2020-03-16 NOTE — ECHO
HEIGHT: 5 ft 11 in   WEIGHT: 327 lb 0 oz   DATE OF STUDY: 3/16/2020   REFER DR: Jamison Myers MD

2-DIMENSIONAL: YES

         M.MODE: YES

     DOPPLER: YES

    COLOR FLOW: YES

    

                        TDS:  YES

    PORTABLE: NO

     DEFINITY:  NO

    BUBBLE STUDY: NO

    

    

DIAGNOSIS:  ATRIAL FIBRILLATOR 



CARDIAC HISTORY:  

CATHERIZATION: NO

    SURGERY: NO

    PROSTHETIC VALVE: NO

    PACEMAKER: NO

    

    

MEASUREMENTS (cm)

    DIASTOLIC (NORMALS)                 SYSTOLIC (NORMALS)

IVSd                 1.7 (0.6-1.2)                    LA Diam 3.4 (1.9-4.0)     LVEF       
  64%  

LVIDd               4.4 (3.5-5.7)                        LVIDs      2.9 (2.0-3.5)     %FS  
        35%

LVPWd             1.6 (0.6-1.2)

Ao Diam           3.2 (2.0-3.7)



2 DIMENSIONAL ASSESSMENT:

RIGHT ATRIUM:                   NORMAL

    LEFT ATRIUM:       NORMAL

    

    RIGHT VENTRICLE:            NORMAL

    LEFT VENTRICLE: LEFT VENTRICULAR HYPERTROPHY

    

    TRICUSPID VALVE:             NORMAL

    MITRAL VALVE:     NORMAL

    

    PULMONIC VALVE:             NORMAL

    AORTIC VALVE:     SCLEROSIS

    

    PERICARDIAL EFFUSION: NONE

    AORTIC ROOT:      NORMAL

    

    

LEFT VENTRICULAR WALL MOTION:     NORMAL.



DOPPLER/COLOR FLOW:     IMPAIRED LEFT VENTRICUALR RELAXATION.



COMMENTS:      NORMAL LEFT VENTRICULAR EJECTION FRACTION. LEFT VENTRICULAR HYPERTROPHY. 
AORTIC SCLEROSIS WITH NO AORTIC STENOSIS/ AORTIC REGURGITATION. IMPAIRED LEFT VENTRICULAR 
RELAXATION. SINUS TACHYCARDIA, 115 BEATS PER MINUTE DURING THIS STUDY. 



TECHNOLOGIST:   BRIDGETTE PATEL

## 2020-03-16 NOTE — RAD REPORT
EXAM DESCRIPTION:  RAD - Chest Single View - 3/14/2020 12:11 am

 

******** ADDENDUM #1 ********

A right upper extremity PICC is present with the tip difficult to fully appreciate, is felt to be in 
the region of the SVC/RA junction.

Electronically signed by:   Esther Barry MD   3/14/2020 5:04 AM CDT Workstation: [id number]

*** End of Addendum ***

 

EXAM DESCRIPTION:  Chest Single View

 

CLINICAL HISTORY:  69 years   Male   S/P PICC insertion

 

COMPARISON:  None

 

TECHNIQUE:  AP view of the chest was obtained.

 

FINDINGS:  Cardiac silhouette is enlarged. Central vessels are not increased.

No peripheral catheter identified.

No infiltrates or effusions seen. No consolidation. No pneumothorax.

 

IMPRESSION:  No peripheral catheter identified.

No active cardiopulmonary disease.

 

Electronically signed by:   Dia Barber MD   3/14/2020 12:26 AM CDT Workstation: [id number]

 

 

 

Due to temporary technical issues with the PACS/Fluency reporting system, reports are being signed by
 the in house radiologist as a courtesy to ensure prompt reporting. The interpreting radiologist is f
ully responsible for the content of the report.

## 2020-03-16 NOTE — PN
Date of Progress Note:  03/16/2020



Subjective:  Patient was seen this morning for followup.  No new complaints problems reported by him.
  Lying in bed, sleeping easily, arousable, not in distress.



Objective:  Vital Signs:  Reviewed. 

HEENT:  Unremarkable. 

Lungs:  Clear to auscultation. 

Cardiac:  Heart sounds normal. 

Abdomen:  Soft, bowel sounds normal.  No guarding, rigidity, tenderness, or distention. 

Extremities:  Right leg, no edema.  Left leg edema is unchanged to slightly better.  The discoloratio
n of the skin from left thigh and left lower leg remains unchanged from yesterday.  Skin on the poste
romedial aspect of the left leg has some evidence of fluid collection under the epidermal skin layer 
and multiple different areas.



Laboratory Data:  White count 14.9, hemoglobin 8.6, platelets 230.  Sodium 140, potassium 3.7, chlori
de 108, bicarb 24, BUN 51, creatinine 2.57, glucose 93, magnesium 1.8.  Wound culture growing MRSA.



Impression:  

1.Cellulitis, left leg, organism methicillin-resistant Staphylococcus aureus.

2.Acute kidney injury.

3.Anemia.

4.Hypertension.

5.Paroxysmal atrial fibrillation.



Plan:  We will go ahead and consult Dr. Sevilla for evaluation to see if he needs debridement done on t
he left leg.  Continue current antibiotics.  He is responding well.  Acute kidney injury problem is i
mproving well and physical therapy was consulted.  We will get a venous Doppler of both lower extremi
ty to rule out any DVT and we will 

see him tomorrow for followup.  Details of plan of treatment discussed with the patient.





MARY/MODL

DD:  03/16/2020 18:13:56Voice ID:  [id number]

DT:  03/16/2020 20:30:12Report ID:  [id number]

## 2020-03-16 NOTE — CON
Date of Consultation:  03/16/2020



Reason For Consultation:  Left leg cellulitis, lymphangitis, lymphedema, and wounds.



History Of Present Illness:  Patient is a 69-year-old gentleman who was admitted with fever, chills, 
and feeling weak.  Diagnosis of cellulitis of the left leg.  Was started on IV antibiotics and over t
he next few days, the redness slightly improved.  However, he developed some blisters and wounds on h
is left leg and I was consulted.  He is awake, alert, states that it is very painful to the touch.  N
o more fevers.  No purulent discharge, but he has a lot of blistering of the skin that needs debridem
ent.



Review of Systems:

Otherwise unremarkable.



Medical History:  High blood pressure, goiter, hyperlipidemia, prostate cancer, lymphedema of the leg
s, osteoarthritis.



Past Surgical History:  Partial thyroidectomy.



Allergies:  NONE.



Social History:  Patient does not smoke.  Drinks occasionally.



Family History:  Significant for coronary artery disease, hypertension, Parkinson disease.



Physical Examination:

Vital Signs:  Significant for slightly elevated heart rate 112, blood pressure is 187/89, and his tem
perature is 99.6.  

General:  He is awake, alert, and oriented x3. 

Head and Neck:  Cranial nerves 2 through 12 are grossly within normal limits.  No neck masses.  No JV
D.  Throat clear.  Neck is supple. 

Chest:  Clear. 

Heart:  S1 and S2. 

Abdomen:  Soft.  

Extremities:  Dorsalis pedis and posterior tibial not palpable because of extensive lymphedema.  The 
left leg below the knee there is redness, warmth, edema.  There is blistering of the skin on the lowe
r leg as well as the dorsum of the foot extending toward the plantar aspect of the foot.  It is a ras
y large area.



Laboratory Data:  White count is 14.9 with a left shift.  H and H are 8.6 and 25.8.  Chemistry review
ed.  His lactic acid, procalcitonin were slightly elevated on admission.  Currently, his lactic acid 
is 0.7.



Assessment:  Left leg cellulitis with wound and lymphedema.



Recommendations:  Continue IV antibiotics.  Patient is growing methicillin-resistant Staphylococcus a
ureus, therefore he is on vancomycin for that as well as clindamycin and we will tomorrow take him to
 surgery, debride all those blisters under sedation as it is very painful for the patient to do that 
at bedside.  Patient understands the risks, benefits, and alternatives and agrees to procedure.  Plan
 of care discussed with Dr. Hansen.





AS/KEVIN

DD:  03/16/2020 11:32:26Voice ID:  [id number]

DT:  03/16/2020 11:56:33Report ID:  [id number]

## 2020-03-16 NOTE — EKG
Test Date:    2020-03-15               Test Time:    07:31:07

Technician:   JONES                                    

                                                     

MEASUREMENT RESULTS:                                       

Intervals:                                           

Rate:         109                                    

MN:           180                                    

QRSD:         86                                     

QT:           328                                    

QTc:          441                                    

Axis:                                                

P:            45                                     

MN:           180                                    

QRS:          30                                     

T:            9                                      

                                                     

INTERPRETIVE STATEMENTS:                                       

                                                     

Sinus tachycardia

Otherwise normal ECG

Compared to ECG 03/15/2020 03:25:22

Atrial fibrillation no longer present



Electronically Signed On 03-16-20 21:50:43 CDT by Herman Carrillo

## 2020-03-16 NOTE — P.OP
Preoperative diagnosis: ESRD


Postoperative diagnosis: same


Primary procedure: JOHN Yost, Fluoroscopy


Anesthesia: MAC


Estimated blood loss: min


Specimen: none


Findings: as above


Complications: None


Transferred to: Recovery Room


Condition: Good

## 2020-03-17 LAB
BUN BLD-MCNC: 48 MG/DL (ref 7–18)
GLUCOSE SERPLBLD-MCNC: 90 MG/DL (ref 74–106)
HCT VFR BLD CALC: 25.9 % (ref 39.6–49)
LYMPHOCYTES # SPEC AUTO: 0.5 K/UL (ref 0.7–4.9)
MAGNESIUM SERPL-MCNC: 1.8 MG/DL (ref 1.8–2.4)
MORPHOLOGY BLD-IMP: (no result)
PMV BLD: 7.5 FL (ref 7.6–11.3)
POTASSIUM SERPL-SCNC: 3.9 MMOL/L (ref 3.5–5.1)
RBC # BLD: 2.82 M/UL (ref 4.33–5.43)
TOXIC GRANULES BLD QL SMEAR: PRESENT

## 2020-03-17 PROCEDURE — 0JDR3ZZ EXTRACTION OF LEFT FOOT SUBCUTANEOUS TISSUE AND FASCIA, PERCUTANEOUS APPROACH: ICD-10-PCS

## 2020-03-17 PROCEDURE — 0JDP3ZZ EXTRACTION OF LEFT LOWER LEG SUBCUTANEOUS TISSUE AND FASCIA, PERCUTANEOUS APPROACH: ICD-10-PCS

## 2020-03-17 RX ADMIN — SODIUM CHLORIDE SCH: 0.9 INJECTION, SOLUTION INTRAVENOUS at 16:00

## 2020-03-17 RX ADMIN — SODIUM CHLORIDE SCH MLS: 0.9 INJECTION, SOLUTION INTRAVENOUS at 17:07

## 2020-03-17 RX ADMIN — SODIUM CHLORIDE SCH MLS: 9 INJECTION, SOLUTION INTRAVENOUS at 17:06

## 2020-03-17 RX ADMIN — METOPROLOL TARTRATE SCH MG: 50 TABLET, FILM COATED ORAL at 09:01

## 2020-03-17 RX ADMIN — CLONIDINE HYDROCHLORIDE SCH MG: 0.3 TABLET ORAL at 14:05

## 2020-03-17 RX ADMIN — TAMSULOSIN HYDROCHLORIDE SCH MG: 0.4 CAPSULE ORAL at 09:02

## 2020-03-17 RX ADMIN — HYDRALAZINE HYDROCHLORIDE SCH MG: 25 TABLET, FILM COATED ORAL at 09:01

## 2020-03-17 RX ADMIN — CLONIDINE HYDROCHLORIDE SCH MG: 0.3 TABLET ORAL at 20:29

## 2020-03-17 RX ADMIN — SODIUM CHLORIDE SCH MLS: 9 INJECTION, SOLUTION INTRAVENOUS at 09:03

## 2020-03-17 RX ADMIN — MORPHINE SULFATE ONE MG: 4 INJECTION, SOLUTION INTRAMUSCULAR; INTRAVENOUS at 12:38

## 2020-03-17 RX ADMIN — CLONIDINE HYDROCHLORIDE SCH MG: 0.3 TABLET ORAL at 09:02

## 2020-03-17 RX ADMIN — SODIUM CHLORIDE SCH MLS: 9 INJECTION, SOLUTION INTRAVENOUS at 01:22

## 2020-03-17 RX ADMIN — SODIUM CHLORIDE SCH MLS: 9 INJECTION, SOLUTION INTRAVENOUS at 20:28

## 2020-03-17 RX ADMIN — METOPROLOL TARTRATE SCH MG: 50 TABLET, FILM COATED ORAL at 20:29

## 2020-03-17 RX ADMIN — MORPHINE SULFATE ONE MG: 4 INJECTION, SOLUTION INTRAMUSCULAR; INTRAVENOUS at 12:33

## 2020-03-17 RX ADMIN — SODIUM CHLORIDE SCH MLS: 0.9 INJECTION, SOLUTION INTRAVENOUS at 01:22

## 2020-03-17 RX ADMIN — HYDRALAZINE HYDROCHLORIDE SCH MG: 25 TABLET, FILM COATED ORAL at 20:30

## 2020-03-17 RX ADMIN — ENOXAPARIN SODIUM SCH MG: 30 INJECTION SUBCUTANEOUS at 17:07

## 2020-03-17 NOTE — PN
Date of Progress Note:  03/16/2020



Chief Complaint:  Acute kidney injury on chronic kidney disease.



History Of Present Illness:  Patient developed severe acute kidney injury.  
Serum creatinine was elevated up to 3.6, and in the ER was 2.6.  Patient has 
progressively worse renal dysfunction "the patient has multiple medical 
problems including history of hypertension, prostate cancer, on radiation until 
December 2012 and started on Lasix for volume control".  Patient was found to 
have hypokalemia and hypomagnesemia, received replacement.  Patient is on IV 
fluids for hydration to treat prerenal azotemia and stabilize renal function.  
Patient was found to have severe leukocytosis.  White count is 14.9,000 and is 
improving around 15.4,000.  Chemistry panel shows some improvement of renal 
function since yesterday.  BUN is 51, creatinine 2.57.



Laboratory Data:  Sodium 140, potassium 3.7, chloride 104, CO2 24, calcium 8.3.



Review of Systems:

Denies fevers or chills.



Physical Examination:

Lungs:  Clear to auscultation bilaterally. 

Heart:  S1-S2. 

Abdomen:  Soft, benign, nontender. 

Extremities:  Minimal edema.



Impression And Plan:  

1.   Acute on chronic kidney injury.  Avoid nephrotoxic medication.  Continue 
IV hydration.  Patient has multiple medical problems including history of 
prostate cancer.  Patient may need to be evaluated by urologist.

2.   Continue hypertensive medication.  Monitor renal function.  Creatinine 
baseline is 1.0.  Patient is recovering from acute kidney injury.  Patient 
developed an acute tubular necrosis secondary to ongoing sepsis.  Continue 
antibiotics and adjust antibiotics to kidney function.  Patient has sepsis and 
followup 

blood cultures are pending.  Patient is on vancomycin.  Monitor vancomycin 
toxicity.

I spent total 36 min including 25 min to coordinate care plan.



DARION/MODL

DD:  03/16/2020 23:26:13   Voice ID:  [id number]

DT:  03/17/2020 02:09:02   Report ID:  [id number]

RICK

## 2020-03-17 NOTE — PN
Date of Progress Note:  03/17/2020



Subjective:  Patient was seen this morning for followup.  He was lying in bed not in distress, overal
l feels better.  No new complaints problems reported.  No constipation or diarrhea.  No shortness of 
breath.



Objective:  Vital Signs:  Reviewed. 

HEENT:  Unremarkable. 

Lungs:  Clear to auscultation. 

Cardiac:  Heart sounds normal. 

Abdomen:  Soft, bowel sounds normal.  No guarding, rigidity, tenderness, or distention. 

Extremities:  Left leg edema is unchanged.  Redness of left leg unchanged, but overall much better th
an before.  Has multiple blisters over left foot and left lower leg more today than yesterday.



Laboratory Data:  White count 11.1, hemoglobin 8.8, platelets 262.  Sodium 142, potassium 3.9, chlori
de 110, bicarb 25, BUN 48, creatinine 2.05, glucose 98, magnesium 1.8.



Impression:  

1.Cellulitis, left leg, organism methicillin resistant Staphylococcus aureus.

2.Acute kidney injury.

3.Hypertension.

4.Paroxysmal atrial fibrillation.



Plan:  We will go ahead and continue current antibiotic.  Patient is responding very well.  White cou
nt is almost back to normal.  Procalcitonin has improved on a day-to-day basis.  Renal function is al
so improving now for last 48 hours.  We will continue current medication, IV fluid, antibiotics.  Dr. Sevilla will perform surgery 

today for debridement of this multiple blisters on left leg and I will see him tomorrow for followup.






MARY/MODL

DD:  03/17/2020 18:12:34Voice ID:  [id number]

DT:  03/17/2020 21:05:22Report ID:  [id number]

## 2020-03-17 NOTE — P.OP
Preoperative diagnosis: Infected wound left lef with cellulitis and lymphedema


Postoperative diagnosis: same


Primary procedure: Debridement Left Leg and Foot Wound 45x20 cm to Partial 

Thickness


Anesthesia: General


Estimated blood loss: min


Specimen: Debridement Tissue C&S


Findings: as above


Complications: None


Transferred to: Recovery Room


Condition: Good

## 2020-03-17 NOTE — OP
Date of Procedure:  03/17/2020



Surgeon:  Betito Sevilla MD



Preoperative Diagnosis:  Infected wound, left lower leg with cellulitis, and lymphedema.



Postoperative Diagnosis:  Infected wound, left lower leg with cellulitis, and lymphedema.



Procedure:  Debridement, left leg and foot wound, partial thickness in nature, 45 x 20 cm total surfa
ce area.



Estimated Blood Loss:  Minimal.



Specimen:  Necrotic tissue and culture and sensitivity.



Findings:  As above.



Anesthesia:  General.



Complications:  None.



Disposition:  Patient tolerated the procedure in stable condition, taken to Recovery in good general 
condition.



Operative Note:  Patient was brought to the OR and placed in supine position, general anesthesia begu
n.  Patient prepped and draped in the usual sterile fashion.  Then, a scrub brush, scissors utilized 
to remove all of the blisters that extended from a little bit in the thigh on the left side anteriorl
y, quite a bit on the left anterior, lateral, and medial leg and quite a bit on the foot and in betwe
en the toes, the total area was measured.  It was in total 45 x 20 cm and all of these blisters and w
ounds were debrided with scissors and scrub brush and pickups to partial thickness level.  Entire leg
 was irrigated and then no evidence of bleeding was noted.  Silvadene 

dressing was applied.  The patient was awakened and taken to Recovery in good general condition.





AS/MODL

DD:  03/17/2020 13:55:33Voice ID:  [id number]

DT:  03/17/2020 15:27:49Report ID:  [id number]

## 2020-03-17 NOTE — P.PN
Subjective


Date of Service: 20


Chief Complaint: fever, weakness





Subjective 


A 69-year-old with PMHx of HTN , prostate CA  was on radiation therapy until 2019 and started last month on Zytiga


pt was sent fro fever and chills 


pt was started on Zytiga in med February


in Er cr 2.6 and elevated to 3.6 








today 


feels better , mild tachycardia 


Cr improving to 2.0 , 


cont IVF will reduce rate tomorrow 


scheduled for debridment today 


vacn level was high yesterday, dose adjusted 








Allergies:  NO KNOWN ALLERGIES.





Medications:  Amlodipine 5 mg 2 times a day, aspirin 81 mg daily, clonidine 0.3 

mg 3 times a day, hydralazine 50 mg p.o. 2 times a day, hydrochlorothiazide 

12.5 mg p.o. daily, lisinopril 20 mg p.o. 2 times a day, metoprolol tartrate 

100 mg p.o. 2 times a day, tamsulosin 0.4 mg p.o. daily.





Review of Systems:


as in HPI 





Past Medical History:  


as in HPI 





Past Surgical History:  Partial thyroidectomy in , due to goiter and this 

was in form of removal of left thyroid lobe.





Family History:  Father , had coronary artery disease, hypertension.  

Mother , had Parkinson disease.





Social History:  Negative for smoking.  Occasional use of alcohol.

















 Physical exam 


general: AAOX3, NAD , obese


Neck; Supple, No elevated JVD 


hear: tachycardia, regular rhythm  normal S1,2 no murmur or rub


Chest: CTAB, no rales or wheezes  


Abdomen: Soft , Nt 


Extremities Lt leg edema and erythema , with Lt leg trace edema 

















A/p 








JACINTO 


Cr ~1.0 at baseline


improving 


possibly due to septic ATn vs Zytiga 


will cont IVF 


renal US no hydro 


will cont IV for now 


cont to hold HCTZ


UPC 2.0 


F/U serology 





HTN 


BP controlled 





Bladder CA 


hold Zytiga 








Sepsis


possibly due to LE cellulites 


scheduled for debridment 


monitor vanco level 


F/U cultures 








tachycardia 


possibly due to sepsis vs reflex from holding BB


now restarted on BB 

















Physical Examination





- Vital Signs


Temperature: 98.3 F


Blood Pressure: 160/87


Pulse: 104


Respirations: 18


Pulse Ox (%): 97





- Studies


Laboratory Data (last 24 hrs)





20 04:45: Sodium 142, Potassium 3.9, BUN 48 H, Creatinine 2.05 H, Glucose 

90, Magnesium 1.8


20 04:45: WBC 11.1 H D, Hgb 8.8 L, Hct 25.9 L, Plt Count 262





Microbiology Data (last 24 hrs): 








20 20:05   Blood  - Blood   Aerobic Blood Culture - Final


                            No growth in 5 days.


20 20:05   Blood  - Blood   Anaerobic Blood Culture - Final


20 13:45   Wound - Left Foot   Gram Stain - Final


20 13:45   Wound - Left Foot   Culture & Sensitivity - Final


                            Meth Resistant Staph Aureus








Assessment And Plan





- Current Problems (Diagnosis)


(1) JACINTO (acute kidney injury)


Current Visit: Yes   Status: Acute

## 2020-03-18 LAB
ALBUMIN SERPL ELPH-MCNC: 2 G/DL (ref 3.8–4.8)
BUN BLD-MCNC: 44 MG/DL (ref 7–18)
GLUCOSE SERPLBLD-MCNC: 97 MG/DL (ref 74–106)
HCT VFR BLD CALC: 19.7 % (ref 39.6–49)
HCT VFR BLD CALC: 23.3 % (ref 39.6–49)
LYMPHOCYTES # SPEC AUTO: 0.3 K/UL (ref 0.7–4.9)
MAGNESIUM SERPL-MCNC: 1.7 MG/DL (ref 1.8–2.4)
PMV BLD: 7.3 FL (ref 7.6–11.3)
POTASSIUM SERPL-SCNC: 3.7 MMOL/L (ref 3.5–5.1)
PROT PATTERN SERPL ELPH-IMP: (no result)
RBC # BLD: 2.12 M/UL (ref 4.33–5.43)

## 2020-03-18 RX ADMIN — CLONIDINE HYDROCHLORIDE SCH MG: 0.3 TABLET ORAL at 13:29

## 2020-03-18 RX ADMIN — HYDRALAZINE HYDROCHLORIDE SCH MG: 25 TABLET, FILM COATED ORAL at 07:34

## 2020-03-18 RX ADMIN — METOPROLOL TARTRATE SCH MG: 50 TABLET, FILM COATED ORAL at 20:16

## 2020-03-18 RX ADMIN — HYDRALAZINE HYDROCHLORIDE SCH MG: 25 TABLET, FILM COATED ORAL at 20:18

## 2020-03-18 RX ADMIN — FAMOTIDINE SCH MG: 10 INJECTION, SOLUTION INTRAVENOUS at 20:18

## 2020-03-18 RX ADMIN — SODIUM CHLORIDE SCH: 0.9 INJECTION, SOLUTION INTRAVENOUS at 02:00

## 2020-03-18 RX ADMIN — SODIUM FERRIC GLUCONATE COMPLEX IN SUCROSE SCH MLS: 12.5 INJECTION INTRAVENOUS at 11:40

## 2020-03-18 RX ADMIN — SILVER SULFADIAZINE SCH APPL: 10 CREAM TOPICAL at 09:00

## 2020-03-18 RX ADMIN — TAMSULOSIN HYDROCHLORIDE SCH MG: 0.4 CAPSULE ORAL at 07:35

## 2020-03-18 RX ADMIN — SODIUM CHLORIDE SCH MLS: 9 INJECTION, SOLUTION INTRAVENOUS at 00:09

## 2020-03-18 RX ADMIN — SODIUM CHLORIDE SCH MLS: 9 INJECTION, SOLUTION INTRAVENOUS at 20:15

## 2020-03-18 RX ADMIN — FAMOTIDINE SCH MG: 10 INJECTION, SOLUTION INTRAVENOUS at 07:36

## 2020-03-18 RX ADMIN — METOPROLOL TARTRATE SCH MG: 50 TABLET, FILM COATED ORAL at 07:35

## 2020-03-18 RX ADMIN — SODIUM CHLORIDE SCH MLS: 0.9 INJECTION, SOLUTION INTRAVENOUS at 04:18

## 2020-03-18 RX ADMIN — SODIUM CHLORIDE SCH MLS: 9 INJECTION, SOLUTION INTRAVENOUS at 07:38

## 2020-03-18 RX ADMIN — CLONIDINE HYDROCHLORIDE SCH MG: 0.3 TABLET ORAL at 07:35

## 2020-03-18 RX ADMIN — ENOXAPARIN SODIUM SCH MG: 30 INJECTION SUBCUTANEOUS at 16:25

## 2020-03-18 RX ADMIN — ACETAMINOPHEN PRN MG: 500 TABLET, FILM COATED ORAL at 00:09

## 2020-03-18 RX ADMIN — SODIUM CHLORIDE SCH MLS: 9 INJECTION, SOLUTION INTRAVENOUS at 07:35

## 2020-03-18 RX ADMIN — CLONIDINE HYDROCHLORIDE SCH MG: 0.3 TABLET ORAL at 20:16

## 2020-03-18 RX ADMIN — SODIUM CHLORIDE SCH MLS: 9 INJECTION, SOLUTION INTRAVENOUS at 18:05

## 2020-03-18 RX ADMIN — HYDROCODONE BITARTRATE AND ACETAMINOPHEN PRN TAB: 5; 325 TABLET ORAL at 20:16

## 2020-03-18 NOTE — PN
Subjective:  I had a nurse sending pictures of the patient's wound and I 
contacted the nurse and discussed the details of the wound, as well as 
evaluated the patient's information on the computer.  Patient is without 
complaints.  He says his leg looks better.  His pain is better and his vitals 
are stable.  He is afebrile.  His white count is 8.5.  His H and H are 7.8 and 
23.3.



Assessment:  Status post debridement of left leg infected wounds and cellulitis 
lymphedema.



Recommendations:  Continue dressing as ordered and IV antibiotics and discharge 
planning.





AS/KEVIN

DD:  03/18/2020 15:23:58   Voice ID:  [id number]

DT:  03/18/2020 19:19:49   Report ID:  [id number]

RICK

## 2020-03-18 NOTE — PN
Date of Progress Note:  03/18/2020



Subjective:  The patient was admitted with acute kidney injury.  Patient has a history of hypertensio
n.  Patient had a fever and was started on Zytiga.  Upon admission, patient was treated as acute kidn
ey injury secondary to toxic acute tubular necrosis/Zytiga.  After IV fluid, kidney function graduall
y started improving.



Physical Examination:

Vital Signs:  When I saw the patient, blood pressure 148/81, pulse of 66.  Patient had good urine out
put. 

Chest:  Clear to auscultation. 

Heart:  S1, S2.  Regular. 

Abdomen:  Soft, nontender.  Morbidly obese. 

Extremity:  Dressing on the left leg.  Plus edema. 

Neuro:  Alert, no focal.



Laboratory Data:  H and H 7.8/23.3.  Sodium 143, potassium 3.7, bicarb 23.  BUN 44, creatinine down t
o 1.7, GFR 40, calcium 7, magnesium 1.7.  TSH of 0.3.  Serology is still pending.  P/C ratio of 2.



Current Medications:  The patient is on, include:

1.Clindamycin.

2.Meropenem.

3.Vancomycin.

4.Flomax.

5.IV iron.

6.Clonidine.

7.Hydralazine.

8.Metoprolol 100 b.i.d.

9.Pepcid.

10.Prednisone.

11.Hydrocodone.



Assessment And Plan:  

1.Acute kidney injury secondary to prerenal, looked to me normal volume.  I am going to go ahead and
 discontinue intravenous fluid.  We will continue to monitor the patient.

2.Proteinuria, possible secondary to obesity.  Serology still pending.  Possible focal segmental tai
merulosclerosis secondary to the obesity.  Kidney function improving.  I doubt to be any autoimmune d
isease.  We will continue to monitor the patient.  Patient is going to be benefitted as outpatient af
ter full recovery of the kidney function, to be started on ACE inhibitor or ARB.

3.Leg infection.  As by primary.  We will follow up current antibiotic.  Follow up vancomycin trough
.

4.Hypokalemia and hypomagnesemia.  We will supplement.





MA/MODL

DD:  03/18/2020 14:50:12Voice ID:  [id number]

DT:  03/18/2020 15:27:28Report ID:  [id number]

## 2020-03-18 NOTE — PN
Date of Progress Note:  03/18/2020



Subjective:  Patient was seen this morning for followup.  He was lying in bed not in distress.  Overa
ll, he feels better.



Objective:  Vital Signs:  Reviewed. 

HEENT:  Unremarkable. 

Lungs:  Clear to auscultation. 

Cardiac:  Heart sounds normal. 

Abdomen:  Soft, bowel sounds normal.  No guarding, rigidity, tenderness, or distention. 

Extremities:  Left leg edema present.  Dressing present between knee and foot and left thigh also has
 a dressing.  Visible area of left thigh has swelling present.  Right leg no edema.



Laboratory Data:  Reviewed.



Impression:  

1.Acute blood loss anemia.

2.Cellulitis, left leg, organism methicillin-resistant Staphylococcus aureus.

3.Acute kidney injury.

4.Hypertension.

5.Paroxysmal atrial fibrillation.



Plan:  We will go ahead and continue current antihypertensive medication.  Blood pressure is under be
tter control.  Continue current antibiotics and white count is normal.  Creatinine is down to 1.7, an
d last 3-4 days renal function is steadily improving.  The patient's hemoglobin was low this morning,
 repeat hemoglobin was done and results reviewed.  On basis of that no need for blood transfusion.  S
tool guaiac was ordered and we will give IV Pepcid per order and I 

will start him on IV iron therapy.  Details plan of treatment discussed with the patient.





MARY/MODL

DD:  03/18/2020 18:00:03Voice ID:  [id number]

DT:  03/18/2020 21:37:06Report ID:  [id number]

## 2020-03-19 LAB
BUN BLD-MCNC: 46 MG/DL (ref 7–18)
GLUCOSE SERPLBLD-MCNC: 91 MG/DL (ref 74–106)
HCT VFR BLD CALC: 23.6 % (ref 39.6–49)
LYMPHOCYTES # SPEC AUTO: 0.5 K/UL (ref 0.7–4.9)
MAGNESIUM SERPL-MCNC: 2 MG/DL (ref 1.8–2.4)
PMV BLD: 7.4 FL (ref 7.6–11.3)
POTASSIUM SERPL-SCNC: 4.2 MMOL/L (ref 3.5–5.1)
RBC # BLD: 2.57 M/UL (ref 4.33–5.43)

## 2020-03-19 RX ADMIN — SODIUM CHLORIDE SCH MLS: 9 INJECTION, SOLUTION INTRAVENOUS at 01:49

## 2020-03-19 RX ADMIN — METOPROLOL TARTRATE SCH MG: 50 TABLET, FILM COATED ORAL at 21:02

## 2020-03-19 RX ADMIN — SODIUM CHLORIDE SCH MLS: 9 INJECTION, SOLUTION INTRAVENOUS at 08:19

## 2020-03-19 RX ADMIN — METOPROLOL TARTRATE SCH MG: 50 TABLET, FILM COATED ORAL at 08:19

## 2020-03-19 RX ADMIN — HYDROCODONE BITARTRATE AND ACETAMINOPHEN PRN TAB: 5; 325 TABLET ORAL at 09:47

## 2020-03-19 RX ADMIN — CLONIDINE HYDROCHLORIDE SCH MG: 0.3 TABLET ORAL at 21:00

## 2020-03-19 RX ADMIN — FAMOTIDINE SCH MG: 10 INJECTION, SOLUTION INTRAVENOUS at 08:19

## 2020-03-19 RX ADMIN — ENOXAPARIN SODIUM SCH MG: 30 INJECTION SUBCUTANEOUS at 16:08

## 2020-03-19 RX ADMIN — HYDRALAZINE HYDROCHLORIDE SCH MG: 25 TABLET, FILM COATED ORAL at 21:00

## 2020-03-19 RX ADMIN — SODIUM FERRIC GLUCONATE COMPLEX IN SUCROSE SCH MLS: 12.5 INJECTION INTRAVENOUS at 09:39

## 2020-03-19 RX ADMIN — HYDROCODONE BITARTRATE AND ACETAMINOPHEN PRN TAB: 5; 325 TABLET ORAL at 23:15

## 2020-03-19 RX ADMIN — ACETAMINOPHEN PRN MG: 500 TABLET, FILM COATED ORAL at 08:18

## 2020-03-19 RX ADMIN — HYDRALAZINE HYDROCHLORIDE SCH MG: 25 TABLET, FILM COATED ORAL at 08:19

## 2020-03-19 RX ADMIN — CLONIDINE HYDROCHLORIDE SCH MG: 0.3 TABLET ORAL at 08:18

## 2020-03-19 RX ADMIN — SODIUM CHLORIDE SCH MLS: 9 INJECTION, SOLUTION INTRAVENOUS at 17:13

## 2020-03-19 RX ADMIN — SILVER SULFADIAZINE SCH APPL: 10 CREAM TOPICAL at 08:21

## 2020-03-19 RX ADMIN — FAMOTIDINE SCH MG: 10 INJECTION, SOLUTION INTRAVENOUS at 21:00

## 2020-03-19 RX ADMIN — SODIUM CHLORIDE SCH MLS: 9 INJECTION, SOLUTION INTRAVENOUS at 05:56

## 2020-03-19 RX ADMIN — TAMSULOSIN HYDROCHLORIDE SCH MG: 0.4 CAPSULE ORAL at 08:19

## 2020-03-19 RX ADMIN — HYDROCODONE BITARTRATE AND ACETAMINOPHEN PRN TAB: 5; 325 TABLET ORAL at 16:08

## 2020-03-19 RX ADMIN — SODIUM CHLORIDE SCH MLS: 9 INJECTION, SOLUTION INTRAVENOUS at 10:40

## 2020-03-19 RX ADMIN — SODIUM CHLORIDE SCH MLS: 9 INJECTION, SOLUTION INTRAVENOUS at 21:00

## 2020-03-19 RX ADMIN — CLONIDINE HYDROCHLORIDE SCH MG: 0.3 TABLET ORAL at 13:50

## 2020-03-19 NOTE — PN
Date of Progress Note:  03/19/2020



Subjective:  Patient was admitted with acute kidney injury secondary to toxic ATN, has cellulitis and
 proteinuria, has anasarca.  Yesterday, we discontinued intravenous fluid.  Kidney function continued
 to improve.



Physical Examination:

Vital Signs:  Blood pressure 165/70, pulse of 83, T-max of 100.7.  Patient had good urine output of 1
800. 

Chest:  Decreased air entry, bilateral bases. 

Heart:  S1, S2.  Regular. 

Extremity: +1 edema.



Laboratory Data:  Patient's WBC 7.7, H and H 8/23.6, platelets 295.  Sodium 140, potassium 4.2, bicar
b 25, BUN 46, creatinine down to 1.5, GFR of 45, calcium 8, magnesium of 2.  P/C ratio of 2.



Current Medications:  The patient is on include:

1.Clindamycin.

2.Meropenem.

3.Vancomycin.

4.Flomax.

5.IV iron.

6.Lovenox.

7.Hydralazine.

8.Clonidine.

9.Metoprolol 100 b.i.d.

10.Tylenol.

11.Pepcid.

12.Prednisone.

13.KCl.



Assessment And Plan:  

1.Acute kidney injury secondary to prerenal/toxic acute tubular necrosis.  Continues to recover, loo
ks still slightly on the wet side.  I am going to start the patient on gentle diuresis.  Patient rece
ived yesterday and today Lasix.  We will start the patient on spironolactone given the proteinuria an
d the hypokalemia and we will follow up.

2.Hypokalemia and hypomagnesemia.  Continue supplement.  We will start the patient on spironolactone
.

3.Peripheral edema and significant proteinuria with normal TSH, mostly secondary to body habitus/obe
sity.  We are not going to start any ACE inhibitor or ARB for the time being.  Start the patient on s
pironolactone.  We will follow up.

4.Cellulitis.  Continue current antibiotic.  He we will follow up with the primary, dose appropriate
.

5.Hypertension.  IV fluid has been discontinued.  We will add spironolactone, p.r.n. Lasix.  Continu
e current treatment.  Keep holding ACE inhibitor or ARB.





MA/KEVIN

DD:  03/19/2020 12:10:57Voice ID:  [id number]

DT:  03/19/2020 15:15:57Report ID:  [id number]

## 2020-03-19 NOTE — PN
Date of Progress Note:  03/19/2020



Subjective:  Patient was seen this morning for followup.  No new complaints or problems reported by wyatt ramos.  Lying in bed, not in any distress.



Objective:  Vital Signs:  Reviewed. 

HEENT:  Unremarkable. 

Lungs:  Clear to auscultation. 

Heart:  Sounds normal. 

Abdomen:  Soft.  Bowel sounds normal.  No guarding, rigidity, tenderness, or distention. 

Extremities:  No leg edema of the right extremity. Left lower extremity leg edema is still significan
t, about grade 3 to grade 4.  Dressing present covering most of the left lower extremity, but the vis
ible part has significant leg edema.  Left medial thigh skin that is visible also has redness along w
ith the swelling.



Laboratory Data:  White count 7.7, hemoglobin 8, platelets 295.  Sodium 140, potassium 4.2, chloride 
108, bicarb 25, BUN 46, creatinine 1.53, glucose 91, magnesium 2.



Impression:  

1.Cellulitis, left leg, organism methicillin-resistant Staphylococcus aureus.

2.Acute kidney injury, improving.

3.Anemia.

4.Hypertension.

5.Paroxysmal atrial fibrillation.



Plan:  We will go ahead and continue current medications.  Continue current antibiotic.  Patient is r
esponding well to the current combination of antibiotics, which will be continued at present time.  W
e will continue to follow with Dr. Sevilla, continue Lovenox, and yesterday the patient was started on 
IV iron and we will continue that 

as well.  No need for blood transfusion at present time.  Lasix 40 mg IV x1 dose was ordered to be gi
nica today.





MARY/MODL

DD:  03/19/2020 19:05:59Voice ID:  [id number]

DT:  03/19/2020 19:40:43Report ID:  [id number]

## 2020-03-19 NOTE — PN
Subjective:  Patient's wounds pictures were shared to me by the nurse and clinical evaluation was don
e with the nurse as well as the computer information.



Objective:  General:  Patient is awake, alert.  No new complaints. 

Vital signs:  Stable.  He is afebrile. 

Extremities:  Examination of the left leg reveals some blistering behind the ankle and near the foot 
that needs some bedside debridement.  Overall, the swelling has gone down and the redness has improve
d.



Laboratory Data:  Cultures are pending.  White count is 7.7.



Assessment:  Cellulitis, lymphedema, left lower extremity with wounds.



Recommendations:  Tomorrow morning, we will debride at the bedside.  Continue IV antibiotics and woun
d care as ordered.





AS/MODL

DD:  03/19/2020 15:17:34Voice ID:  [id number]

DT:  03/19/2020 17:22:25Report ID:  [id number]

## 2020-03-20 LAB
BUN BLD-MCNC: 36 MG/DL (ref 7–18)
GLUCOSE SERPLBLD-MCNC: 101 MG/DL (ref 74–106)
HCT VFR BLD CALC: 25 % (ref 39.6–49)
LYMPHOCYTES # SPEC AUTO: 0.5 K/UL (ref 0.7–4.9)
MAGNESIUM SERPL-MCNC: 1.9 MG/DL (ref 1.8–2.4)
PMV BLD: 7.8 FL (ref 7.6–11.3)
POTASSIUM SERPL-SCNC: 4 MMOL/L (ref 3.5–5.1)
RBC # BLD: 2.74 M/UL (ref 4.33–5.43)

## 2020-03-20 RX ADMIN — SILVER SULFADIAZINE SCH APPL: 10 CREAM TOPICAL at 09:00

## 2020-03-20 RX ADMIN — HYDRALAZINE HYDROCHLORIDE SCH MG: 25 TABLET, FILM COATED ORAL at 20:08

## 2020-03-20 RX ADMIN — SODIUM CHLORIDE SCH MLS: 9 INJECTION, SOLUTION INTRAVENOUS at 01:26

## 2020-03-20 RX ADMIN — METOPROLOL TARTRATE SCH MG: 50 TABLET, FILM COATED ORAL at 09:16

## 2020-03-20 RX ADMIN — SODIUM CHLORIDE SCH MLS: 9 INJECTION, SOLUTION INTRAVENOUS at 09:12

## 2020-03-20 RX ADMIN — CLONIDINE HYDROCHLORIDE SCH MG: 0.3 TABLET ORAL at 20:08

## 2020-03-20 RX ADMIN — FAMOTIDINE SCH MG: 10 INJECTION, SOLUTION INTRAVENOUS at 09:00

## 2020-03-20 RX ADMIN — SODIUM CHLORIDE SCH MLS: 9 INJECTION, SOLUTION INTRAVENOUS at 20:07

## 2020-03-20 RX ADMIN — FAMOTIDINE SCH MG: 10 INJECTION, SOLUTION INTRAVENOUS at 20:09

## 2020-03-20 RX ADMIN — SODIUM CHLORIDE SCH MLS: 9 INJECTION, SOLUTION INTRAVENOUS at 17:15

## 2020-03-20 RX ADMIN — SODIUM FERRIC GLUCONATE COMPLEX IN SUCROSE SCH MLS: 12.5 INJECTION INTRAVENOUS at 09:13

## 2020-03-20 RX ADMIN — MUPIROCIN SCH APPL: 20 OINTMENT TOPICAL at 13:16

## 2020-03-20 RX ADMIN — HYDROCODONE BITARTRATE AND ACETAMINOPHEN PRN TAB: 5; 325 TABLET ORAL at 10:40

## 2020-03-20 RX ADMIN — MUPIROCIN SCH APPL: 20 OINTMENT TOPICAL at 20:09

## 2020-03-20 RX ADMIN — CLONIDINE HYDROCHLORIDE SCH MG: 0.3 TABLET ORAL at 13:17

## 2020-03-20 RX ADMIN — CLONIDINE HYDROCHLORIDE SCH MG: 0.3 TABLET ORAL at 09:15

## 2020-03-20 RX ADMIN — SPIRONOLACTONE SCH MG: 25 TABLET, FILM COATED ORAL at 09:15

## 2020-03-20 RX ADMIN — TAMSULOSIN HYDROCHLORIDE SCH MG: 0.4 CAPSULE ORAL at 09:15

## 2020-03-20 RX ADMIN — ENOXAPARIN SODIUM SCH MG: 30 INJECTION SUBCUTANEOUS at 17:15

## 2020-03-20 RX ADMIN — METOPROLOL TARTRATE SCH MG: 50 TABLET, FILM COATED ORAL at 20:08

## 2020-03-20 RX ADMIN — SODIUM CHLORIDE SCH MLS: 9 INJECTION, SOLUTION INTRAVENOUS at 18:13

## 2020-03-20 RX ADMIN — HYDRALAZINE HYDROCHLORIDE SCH MG: 25 TABLET, FILM COATED ORAL at 09:15

## 2020-03-20 RX ADMIN — Medication SCH IU: at 13:17

## 2020-03-20 NOTE — PN
Date of Progress Note:  03/20/2020



Subjective:  Patient is awake, alert.  No complaints.



Objective:  Vital Signs:  Stable.  He is afebrile.  

Extremities:  Examination of the left leg revealed a few blisters towards the ankle and the foot and 
some dried skin, which was debrided at the bedside and a lot of the wound is healing up very nicely. 
 There is still some erythema and lymphedema but the swelling has gone down and clinically looks much
 better.



Laboratory Data:  Reviewed.  His white count is 6.3, neutrophil left shift has improved.  His culture
s from the OR growing out MRSA sensitive to vancomycin.



Assessment:  Status post debridement left leg infected wound with cellulitis and lymphedema.



Recommendation:  Wound care as ordered.  Patient cleared for discharge on IV antibiotics and will fol
low [facility] my clinic.  I will discuss the plan of care with Dr. Hansen.





AS/KEVIN

DD:  03/20/2020 11:19:19Voice ID:  [id number]

DT:  03/20/2020 12:34:17Report ID:  [id number]

## 2020-03-20 NOTE — P.PN
Subjective


Date of Service: 20


Chief Complaint: fever, weakness





Subjective 


A 69-year-old with PMHx of HTN , prostate CA  was on radiation therapy until 2019 and started last month on Zytiga


pt was sent fro fever and chills 


pt was started on Zytiga in med February


in Er cr 2.6 and elevated to 3.6 








today 


Cr cont to improve 


S/O debridment 


monitor vanco level 


Bp elevated , started on aldactone 


will give lasix X1 








Allergies:  NO KNOWN ALLERGIES.





Medications:  Amlodipine 5 mg 2 times a day, aspirin 81 mg daily, clonidine 0.3 

mg 3 times a day, hydralazine 50 mg p.o. 2 times a day, hydrochlorothiazide 

12.5 mg p.o. daily, lisinopril 20 mg p.o. 2 times a day, metoprolol tartrate 

100 mg p.o. 2 times a day, tamsulosin 0.4 mg p.o. daily.





Review of Systems:


as in HPI 





Past Medical History:  


as in HPI 





Past Surgical History:  Partial thyroidectomy in , due to goiter and this 

was in form of removal of left thyroid lobe.





Family History:  Father , had coronary artery disease, hypertension.  

Mother , had Parkinson disease.





Social History:  Negative for smoking.  Occasional use of alcohol.

















 Physical exam 


general: AAOX3, NAD , obese


Neck; Supple, No elevated JVD 


hear: tachycardia, regular rhythm  normal S1,2 no murmur or rub


Chest: CTAB, no rales or wheezes  


Abdomen: Soft , Nt 


Extremities Lt leg  erythema , edema +1 

















A/p 








JACINTO 


Cr ~1.0 at baseline


improving 


possibly due to septic ATn vs Zytiga 


will cont IVF 


renal US no hydro 


will cont IV for now 


cont to hold HCTZ


UPC 2.0 


F/U serology 





HTN 


elevated 


startted on aldactone 


will give lasix X1 





Bladder CA 


hold Zytiga 








Sepsis


possibly due to LE cellulites 


S/P  debridment 


monitor vanco level 


F/U cultures 














Physical Examination





- Vital Signs


Temperature: 97.3 F


Blood Pressure: 195/87


Pulse: 75


Respirations: 18


Pulse Ox (%): 96





- Studies


Laboratory Data (last 24 hrs)





20 09:40: Sodium 142, Potassium 4.0, BUN 36 H, Creatinine 1.25, Glucose 

101, Magnesium 1.9


20 09:40: WBC 6.3  D, Hgb 8.4 L, Hct 25.0 L, Plt Count 362  D





Microbiology Data (last 24 hrs): 








20 12:08   Wound - L Leg (Lower)   Gram Stain - Final


20 12:08   Wound - L Leg (Lower)   Culture & Sensitivity - Final


                            Meth Resistant Staph Aureus








Assessment And Plan





- Current Problems (Diagnosis)


(1) JACINTO (acute kidney injury)


Current Visit: Yes   Status: Acute

## 2020-03-20 NOTE — PN
Date of Progress Note:  03/20/2020



Subjective:  Patient was seen this morning for followup.  No new complaints or problems reported by wyatt ramos.  He was lying in bed, not in distress.



Objective:  Vital Signs:  Reviewed. 

HEENT:  Unremarkable. 

Lungs:  Clear to auscultation. 

Heart:  Sounds normal. 

Abdomen:  Soft.  Bowel sounds normal.  No guarding, rigidity, tenderness, or distention. 

Extremities:  Left leg edema present, but better today than last 2-3 days.  Redness from left thigh a
nd visible area of left leg around the knee is better today than last few days.  Rest of the left low
er extremity is covered with dressing.



Laboratory Data:  Pending.



Impression:  

1.Cellulitis, left leg, organism methicillin-resistant Staphylococcus aureus.

2.Acute kidney injury.

3.Anemia.

4.Hypertension.

5.Paroxysmal atrial fibrillation.



Plan:  We will continue current medication.  Continue Lovenox for DVT prophylaxis.  We will follow up
 on blood work and then make a decision about IV Lasix.  Patient has received IV Lasix for the last 2
 days and that actually has resulted in improvement in the leg swelling.  Hopefully, we will be able 
to give another dose today depending on the blood test result.  Details and plan of treatment discuss
ed with the 

patient.  Continue current antibiotics and physical therapy to help ambulate the patient.





MARY/MODL

DD:  03/20/2020 11:55:50Voice ID:  [id number]

DT:  03/20/2020 12:33:35Report ID:  [id number]

## 2020-03-21 RX ADMIN — SODIUM CHLORIDE SCH MLS: 9 INJECTION, SOLUTION INTRAVENOUS at 20:26

## 2020-03-21 RX ADMIN — HYDRALAZINE HYDROCHLORIDE SCH MG: 25 TABLET, FILM COATED ORAL at 08:58

## 2020-03-21 RX ADMIN — CLONIDINE HYDROCHLORIDE SCH MG: 0.3 TABLET ORAL at 20:27

## 2020-03-21 RX ADMIN — FAMOTIDINE SCH MG: 10 INJECTION, SOLUTION INTRAVENOUS at 08:59

## 2020-03-21 RX ADMIN — Medication SCH IU: at 10:31

## 2020-03-21 RX ADMIN — MUPIROCIN SCH APPL: 20 OINTMENT TOPICAL at 20:28

## 2020-03-21 RX ADMIN — HYDRALAZINE HYDROCHLORIDE SCH MG: 25 TABLET, FILM COATED ORAL at 20:27

## 2020-03-21 RX ADMIN — HYDROCODONE BITARTRATE AND ACETAMINOPHEN PRN TAB: 5; 325 TABLET ORAL at 14:22

## 2020-03-21 RX ADMIN — FAMOTIDINE SCH MG: 10 INJECTION, SOLUTION INTRAVENOUS at 20:26

## 2020-03-21 RX ADMIN — SODIUM CHLORIDE SCH MLS: 9 INJECTION, SOLUTION INTRAVENOUS at 00:05

## 2020-03-21 RX ADMIN — SODIUM FERRIC GLUCONATE COMPLEX IN SUCROSE SCH MLS: 12.5 INJECTION INTRAVENOUS at 08:59

## 2020-03-21 RX ADMIN — HYDRALAZINE HYDROCHLORIDE SCH MG: 25 TABLET, FILM COATED ORAL at 09:51

## 2020-03-21 RX ADMIN — SODIUM CHLORIDE SCH MLS: 9 INJECTION, SOLUTION INTRAVENOUS at 08:59

## 2020-03-21 RX ADMIN — SPIRONOLACTONE SCH MG: 25 TABLET, FILM COATED ORAL at 08:58

## 2020-03-21 RX ADMIN — TAMSULOSIN HYDROCHLORIDE SCH MG: 0.4 CAPSULE ORAL at 08:57

## 2020-03-21 RX ADMIN — SODIUM CHLORIDE SCH MLS: 9 INJECTION, SOLUTION INTRAVENOUS at 17:27

## 2020-03-21 RX ADMIN — METOPROLOL TARTRATE SCH MG: 50 TABLET, FILM COATED ORAL at 20:27

## 2020-03-21 RX ADMIN — CLONIDINE HYDROCHLORIDE SCH MG: 0.3 TABLET ORAL at 13:55

## 2020-03-21 RX ADMIN — ENOXAPARIN SODIUM SCH MG: 30 INJECTION SUBCUTANEOUS at 17:28

## 2020-03-21 RX ADMIN — SILVER SULFADIAZINE SCH APPL: 10 CREAM TOPICAL at 09:00

## 2020-03-21 RX ADMIN — METOPROLOL TARTRATE SCH MG: 50 TABLET, FILM COATED ORAL at 08:57

## 2020-03-21 RX ADMIN — CLONIDINE HYDROCHLORIDE SCH MG: 0.3 TABLET ORAL at 08:58

## 2020-03-21 RX ADMIN — MUPIROCIN SCH APPL: 20 OINTMENT TOPICAL at 09:00

## 2020-03-21 RX ADMIN — AMLODIPINE BESYLATE SCH MG: 5 TABLET ORAL at 10:30

## 2020-03-21 NOTE — PN
Date of Progress Note:  03/21/2020



Subjective:  The patient was seen this morning for followup.  No new complaints or problems reported 
by him.  Yesterday, he was able to get in and out of bed to the bedside chair.  He is overall feeling
 better.



Objective:  Vital Signs:  Reviewed. 

HEENT:  Unremarkable. 

Lungs:  Clear to auscultation.  No rhonchi or rales. 

Heart:  Sounds normal. 

Abdomen:  Soft.  Bowel sounds normal.  No guarding, rigidity, tenderness, or distention. 

Extremities:  Left leg dressing present, but visible part of left thigh and area around the left knee
, redness and swelling are better compared to yesterday.



Laboratory Data:  There were no labs done this morning.  Yesterday's lab results reviewed.



Impression:  

1.Cellulitis, left leg, improving.

2.Acute kidney injury, resolved.

3.Hypertension.

4.Paroxysmal atrial fibrillation.

5.Anemia.



Plan:  We will go ahead and continue current IV iron therapy.  The patient's hemoglobin for last 2-3 
days is steadily increasing slowly.  We will continue current antibiotics.  Lasix 40 mg IV x1 dose wi
ll be given today per order, so today is day #4 on IV Lasix therapy.  We will give him amlodipine 5 m
g daily, increase dose of hydralazine from 25 mg twice a day to 50 mg twice a day as blood pressure r
emains elevated and continue other current antihypertensive medications.  He 

is getting clonidine and metoprolol per order.  Continue Lovenox.  We will repeat blood work tomorrow
 and I will see him tomorrow.





MARY/MODL

DD:  03/21/2020 13:27:56Voice ID:  [id number]

DT:  03/21/2020 15:29:41Report ID:  [id number]

## 2020-03-22 LAB
BUN BLD-MCNC: 26 MG/DL (ref 7–18)
GLUCOSE SERPLBLD-MCNC: 84 MG/DL (ref 74–106)
HCT VFR BLD CALC: 24.1 % (ref 39.6–49)
LYMPHOCYTES # SPEC AUTO: 0.6 K/UL (ref 0.7–4.9)
MAGNESIUM SERPL-MCNC: 1.7 MG/DL (ref 1.8–2.4)
PMV BLD: 7.6 FL (ref 7.6–11.3)
POTASSIUM SERPL-SCNC: 3.7 MMOL/L (ref 3.5–5.1)
RBC # BLD: 2.66 M/UL (ref 4.33–5.43)

## 2020-03-22 RX ADMIN — METOPROLOL TARTRATE SCH MG: 50 TABLET, FILM COATED ORAL at 20:15

## 2020-03-22 RX ADMIN — POTASSIUM CHLORIDE SCH MEQ: 10 TABLET, FILM COATED, EXTENDED RELEASE ORAL at 20:15

## 2020-03-22 RX ADMIN — POTASSIUM CHLORIDE SCH MEQ: 10 TABLET, FILM COATED, EXTENDED RELEASE ORAL at 09:44

## 2020-03-22 RX ADMIN — SODIUM FERRIC GLUCONATE COMPLEX IN SUCROSE SCH MLS: 12.5 INJECTION INTRAVENOUS at 09:45

## 2020-03-22 RX ADMIN — FAMOTIDINE SCH MG: 10 INJECTION, SOLUTION INTRAVENOUS at 20:16

## 2020-03-22 RX ADMIN — Medication SCH IU: at 08:14

## 2020-03-22 RX ADMIN — MUPIROCIN SCH APPL: 20 OINTMENT TOPICAL at 10:59

## 2020-03-22 RX ADMIN — SODIUM CHLORIDE SCH MLS: 9 INJECTION, SOLUTION INTRAVENOUS at 05:16

## 2020-03-22 RX ADMIN — TAMSULOSIN HYDROCHLORIDE SCH MG: 0.4 CAPSULE ORAL at 08:13

## 2020-03-22 RX ADMIN — CLONIDINE HYDROCHLORIDE SCH MG: 0.3 TABLET ORAL at 20:16

## 2020-03-22 RX ADMIN — SODIUM CHLORIDE SCH MLS: 9 INJECTION, SOLUTION INTRAVENOUS at 09:45

## 2020-03-22 RX ADMIN — CLONIDINE HYDROCHLORIDE SCH MG: 0.3 TABLET ORAL at 08:13

## 2020-03-22 RX ADMIN — ENOXAPARIN SODIUM SCH MG: 30 INJECTION SUBCUTANEOUS at 17:16

## 2020-03-22 RX ADMIN — SODIUM CHLORIDE SCH MLS: 9 INJECTION, SOLUTION INTRAVENOUS at 00:20

## 2020-03-22 RX ADMIN — FLUOCINONIDE SCH APPL: 0.5 CREAM TOPICAL at 10:59

## 2020-03-22 RX ADMIN — SODIUM CHLORIDE SCH MLS: 9 INJECTION, SOLUTION INTRAVENOUS at 20:16

## 2020-03-22 RX ADMIN — AMLODIPINE BESYLATE SCH MG: 5 TABLET ORAL at 08:14

## 2020-03-22 RX ADMIN — SODIUM CHLORIDE SCH MLS: 9 INJECTION, SOLUTION INTRAVENOUS at 17:16

## 2020-03-22 RX ADMIN — SODIUM CHLORIDE SCH MLS: 9 INJECTION, SOLUTION INTRAVENOUS at 08:15

## 2020-03-22 RX ADMIN — FAMOTIDINE SCH MG: 10 INJECTION, SOLUTION INTRAVENOUS at 08:13

## 2020-03-22 RX ADMIN — SPIRONOLACTONE SCH MG: 25 TABLET, FILM COATED ORAL at 08:14

## 2020-03-22 RX ADMIN — ACETAMINOPHEN PRN MG: 500 TABLET, FILM COATED ORAL at 08:12

## 2020-03-22 RX ADMIN — METOPROLOL TARTRATE SCH MG: 50 TABLET, FILM COATED ORAL at 08:14

## 2020-03-22 RX ADMIN — CLONIDINE HYDROCHLORIDE SCH MG: 0.3 TABLET ORAL at 13:02

## 2020-03-22 RX ADMIN — MUPIROCIN SCH APPL: 20 OINTMENT TOPICAL at 20:17

## 2020-03-22 RX ADMIN — HYDRALAZINE HYDROCHLORIDE SCH MG: 25 TABLET, FILM COATED ORAL at 08:13

## 2020-03-22 RX ADMIN — HYDRALAZINE HYDROCHLORIDE SCH MG: 25 TABLET, FILM COATED ORAL at 20:15

## 2020-03-22 NOTE — PN
Date of Progress Note:  03/21/2020



Chief Complaint:  Acute kidney injury.



History Of Present Illness:  Patient has nonoliguric urine output.  He 
developed prerenal azotemia and nonoliguric ATN.  Patient was admitted to the 
hospital because of debridement of the lower extremity and treatment for wound 
infection in the lower extremity.  Patient is a 69-year-old man with a history 
of hypertension and prostate cancer, underwent radiation in December 2019 and 
last month he was started on Zytiga.  He came to the emergency room because of 
fever and chills.  He was started on Zytiga in February.  On arrival to the ER, 
creatinine level was 2.6 and previously was elevated during this admission up 
to 3.6.  Patient is taking multiple medications for blood pressure including 
hydrochlorothiazide, lisinopril, and amlodipine.



Review of Systems:

Patient denies fever or chills.



Physical Examination:

Lungs:  Diminished breath sounds at bases. 

Heart:  S1, S2. 

Abdomen:  Soft, benign. 

Extremities:  Edema present.  Dressing in place.



Blood Work:  Sodium is 140, potassium 4.0, chloride 109, CO2 of 26, BUN 36, 
creatinine 1.25, magnesium 1.9, calcium 8.6.



Impression And Plan:  

1.   Acute kidney injury, nonoliguric.  Renal function has improved over last 
several days.  Creatinine is improving from 3.6 gradually to 1.25.  Continue to 
monitor renal function.  Avoid nephrotoxic medication.

2.   Hypertension.  Continue blood pressure medication.

3.   Lower extremity infection and cellulitis.  Continue antibiotics.  Adjust 
antibiotics to kidney function.

4.   Edema.  Patient may need diuretic to control edema.

5.   History of prostate cancer.  Patient will continue adequate p.o. fluid 
intake and he is on Flomax to prevent bladder outlet obstruction.  Patient was 
treated with Zytiga, currently is on hold.  He will follow up with the 
urologist and oncologist.

6.   Patient has chronic kidney disease stage 3.  Baseline creatinine level 
1.0.  He developed acute kidney injury secondary to nonoliguric acute tubular 
necrosis versus Zytiga.  Patient completed IV fluids.  Renal ultrasound did not 
show hydronephrosis.  Continue to hold hydrochlorothiazide.  Monitor blood 
pressure and adjust medications accordingly.

I spent total 36 min including 25 min to coordinate care plan.



DARION/KEVIN

DD:  03/22/2020 07:19:39   Voice ID:  [id number]

DT:  03/22/2020 07:40:55   Report ID:  [id number]

MTDD

## 2020-03-22 NOTE — PN
Date of Progress Note:  03/22/2020



Subjective:  Patient was seen this morning for followup.  He was feeling much better.  Yesterday, he 
did ambulate with the physical therapy and walked about 200 feet.  He is using walker to ambulate.  D
enies any constipation problem.  Pain in left leg is much better.



Objective:  Vital Signs:  Reviewed. 

HEENT:  Unremarkable. 

Lungs:  Clear to auscultation. 

Heart:  Sounds normal. 

Abdomen:  Soft.  Bowel sounds normal.  No guarding, rigidity, tenderness, or distention. 

Extremities:  Left leg edema present, but it is improving with Lasix therapy.  Today, dressing from l
eft leg was removed and left leg appears significantly better, compared to how it was before.  Patien
t had surgery done by Dr. Sevilla.  Patient had multiple blisters on the left leg, all of those blister
s have completely resolved.  His edema from left lower leg between knee and foot is significantly bet
ter.  He has more edema in the thigh compared to the lower leg. 

Skin:  An area between knee and foot is faintly pink to brownish in color and some area has loss of e
pidermal skin where he had blisters, but there are no new blisters and no discharge, no bleeding.  Le
ft thigh has little bit more pinkish discoloration of the skin compared to lower today, but once agai
n, overall entire left lower extremity appears significantly better than before.



Laboratory Data:  White count 6.2, hemoglobin 8.2, platelets 385.  Sodium 140, potassium 3.7, chlorid
e 108, bicarb 29, BUN 26, creatinine 1.11, glucose 84, magnesium 1.7.



Impression:  

1.Left leg cellulitis, organism methicillin-resistant Staphylococcus aureus.

2.Acute kidney injury, resolved.

3.Anemia, stable.

4.Hypertension.

5.Paroxysmal atrial fibrillation.



Plan:  We will continue current anticoagulation therapy, antibiotics, Lasix 40 mg IV x1 dose will be 
given.  Considering today's potassium value, we will go ahead and give some oral potassium replacemen
t.  Intake and output records reviewed.  Ambulation was encouraged.  I will see him tomorrow for foll
owup and we will request Social Service now to go ahead and start making arrangements for patient to 
go home with home IV antibiotic therapy.  Plan is to continue vancomycin at home probably for about 2
 weeks and we will change IV clindamycin to oral clindamycin upon discharge.  Details and plan of matthew
atment discussed with the patient.





MARY/MODL

DD:  03/22/2020 09:43:26Voice ID:  [id number]

DT:  03/22/2020 10:05:11Report ID:  [id number]

## 2020-03-22 NOTE — PN
Date of Progress Note:  03/22/2020



Chief Complaint:  Acute kidney injury.



History Of Present Illness:  Patient has nonoliguric urine output.  Patient has 
nonoliguric ATN, prerenal azotemia.  He underwent debridement of lower 
extremities, wound infection and cellulitis.  The patient is on vancomycin.



Review of Systems:

Denies fever or chills.



Physical Examination:

Lungs:  Clear to auscultation bilaterally. 

Heart:  S1, S2. 

Abdomen:  Soft, benign. 

Extremities:  Edema present in both legs, improving.



Assessment And Plan:  

1.   Acute kidney injury.  Renal function is gradually improving.  Monitor 
electrolytes.  Adjust treatment with electrolytes, replacement as needed.  
Patient will continue diuretics for control of leg edema.  Patient is on 
antibiotic for osteomyelitis.

2.   History of prostate cancer.  Continue to adjust adequate hydration.  The 
patient tolerates p.o. intake.  We will continue Flomax to prevent bladder 
outlet obstruction.  Patient was treated with Zytiga.  Currently, it is on hold 
because of the concern that it is the cause of acute kidney injury, although he 
will need to resume medication with Urology.

3.   Acute on chronic kidney injury with hypertensive heart and kidney 

disease.  Patient is improving with renal functions.  Continue to hold 
diuretics.  Patient is off hydrochlorothiazide.





DARION/KEVIN

DD:  03/22/2020 21:21:21   Voice ID:  [id number]

DT:  03/22/2020 22:22:08   Report ID:  [id number]

RICK

## 2020-03-22 NOTE — PN
This is a telephonic note with via telemedicine. 



Patient is awake, alert, no complaints. 



Vital signs are stable. 



His temperature is 99.8, white count is 6.2, H and H are 8.2 and 24.1. 



Electrolytes reviewed and picture reviewed. 



On physical exam, picture reviewed via text and there is no open wound.  There is dry skin all around
.  The redness is markedly decreased as had the swelling.  There is no purulent discharge anywhere on
 the dressing.



Assessment:  Cellulitis, infected wound, left leg improving.



Recommendations:  We will switch the dressing to Lidex.  Medical management per Dr. aHnsen.  Continue I
V antibiotics and follow up [facility] upon discharge and discharge planning.





AS/MODL

DD:  03/22/2020 08:48:42Voice ID:  [id number]

DT:  03/22/2020 09:10:30Report ID:  [id number]

## 2020-03-23 LAB
BUN BLD-MCNC: 21 MG/DL (ref 7–18)
GLUCOSE SERPLBLD-MCNC: 91 MG/DL (ref 74–106)
MAGNESIUM SERPL-MCNC: 1.8 MG/DL (ref 1.8–2.4)
POTASSIUM SERPL-SCNC: 3.9 MMOL/L (ref 3.5–5.1)

## 2020-03-23 RX ADMIN — MUPIROCIN SCH APPL: 20 OINTMENT TOPICAL at 08:12

## 2020-03-23 RX ADMIN — SODIUM CHLORIDE SCH MLS: 9 INJECTION, SOLUTION INTRAVENOUS at 00:01

## 2020-03-23 RX ADMIN — MUPIROCIN SCH APPL: 20 OINTMENT TOPICAL at 21:27

## 2020-03-23 RX ADMIN — POTASSIUM CHLORIDE SCH MEQ: 10 TABLET, FILM COATED, EXTENDED RELEASE ORAL at 21:25

## 2020-03-23 RX ADMIN — SODIUM FERRIC GLUCONATE COMPLEX IN SUCROSE SCH MLS: 12.5 INJECTION INTRAVENOUS at 10:31

## 2020-03-23 RX ADMIN — ENOXAPARIN SODIUM SCH MG: 30 INJECTION SUBCUTANEOUS at 21:25

## 2020-03-23 RX ADMIN — SODIUM CHLORIDE SCH MLS: 9 INJECTION, SOLUTION INTRAVENOUS at 21:26

## 2020-03-23 RX ADMIN — FLUOCINONIDE SCH APPL: 0.5 CREAM TOPICAL at 08:12

## 2020-03-23 RX ADMIN — POTASSIUM CHLORIDE SCH MEQ: 10 TABLET, FILM COATED, EXTENDED RELEASE ORAL at 08:09

## 2020-03-23 RX ADMIN — ACETAMINOPHEN PRN MG: 500 TABLET, FILM COATED ORAL at 11:58

## 2020-03-23 RX ADMIN — HYDRALAZINE HYDROCHLORIDE SCH MG: 25 TABLET, FILM COATED ORAL at 08:10

## 2020-03-23 RX ADMIN — FAMOTIDINE SCH MG: 10 INJECTION, SOLUTION INTRAVENOUS at 08:11

## 2020-03-23 RX ADMIN — METOPROLOL TARTRATE SCH MG: 50 TABLET, FILM COATED ORAL at 21:25

## 2020-03-23 RX ADMIN — CLONIDINE HYDROCHLORIDE SCH MG: 0.3 TABLET ORAL at 13:11

## 2020-03-23 RX ADMIN — SODIUM CHLORIDE SCH MLS: 9 INJECTION, SOLUTION INTRAVENOUS at 18:25

## 2020-03-23 RX ADMIN — TAMSULOSIN HYDROCHLORIDE SCH MG: 0.4 CAPSULE ORAL at 08:09

## 2020-03-23 RX ADMIN — SPIRONOLACTONE SCH MG: 25 TABLET, FILM COATED ORAL at 08:08

## 2020-03-23 RX ADMIN — SODIUM CHLORIDE SCH MLS: 9 INJECTION, SOLUTION INTRAVENOUS at 08:07

## 2020-03-23 RX ADMIN — CLONIDINE HYDROCHLORIDE SCH MG: 0.3 TABLET ORAL at 21:24

## 2020-03-23 RX ADMIN — AMLODIPINE BESYLATE SCH MG: 5 TABLET ORAL at 08:10

## 2020-03-23 RX ADMIN — CLONIDINE HYDROCHLORIDE SCH MG: 0.3 TABLET ORAL at 09:08

## 2020-03-23 RX ADMIN — HYDRALAZINE HYDROCHLORIDE SCH MG: 25 TABLET, FILM COATED ORAL at 21:25

## 2020-03-23 RX ADMIN — FAMOTIDINE SCH MG: 10 INJECTION, SOLUTION INTRAVENOUS at 21:25

## 2020-03-23 RX ADMIN — SODIUM CHLORIDE SCH MLS: 9 INJECTION, SOLUTION INTRAVENOUS at 08:06

## 2020-03-23 RX ADMIN — METOPROLOL TARTRATE SCH MG: 50 TABLET, FILM COATED ORAL at 08:11

## 2020-03-23 RX ADMIN — ENOXAPARIN SODIUM SCH MG: 30 INJECTION SUBCUTANEOUS at 08:09

## 2020-03-23 RX ADMIN — SODIUM CHLORIDE SCH MLS: 9 INJECTION, SOLUTION INTRAVENOUS at 17:39

## 2020-03-23 RX ADMIN — Medication SCH IU: at 08:09

## 2020-03-23 NOTE — PN
Date of Progress Note:  03/23/2020



Chief Complaint:  Acute kidney injury.



Subjective:  Patient has nonoliguric urine output.  Patient developed ATN, 
prerenal azotemia, and during this admission, he underwent debridement of the 
lower extremity infection and cellulitis with nonhealing wounds.  Patient is on 
vancomycin.  Renal function has improved in response to IV fluids.  
Electrolytes are stable.



Review of Systems:

Denies fever, chills.



Physical Examination:

Lungs:  Clear to auscultation bilaterally. 

Heart:  S1, S2. 

Abdomen:  Soft, benign. 

Extremities:  Edema is improving.



Assessment And Plan:  

1.   Acute on chronic kidney injury.  Monitor electrolytes.  Avoid nephrotoxic 
medication.  Patient has adequate hydration by mouth.  Continue IV hydration as 
needed.

2.   Osteomyelitis.  Patient is on antibiotics for nonhealing lower extremity 
wound with osteomyelitis.  Monitor vancomycin level and electrolytes.  Renal 
panel will be done in the morning.





DARION/KEVIN

DD:  03/23/2020 23:09:11   Voice ID:  [id number]

DT:  03/23/2020 23:40:35   Report ID:  [id number]

RICK

## 2020-03-23 NOTE — PN
Subjective:  Patient is awake, alert, no complaints, feels better.



Objective:  Vitals:  Stable.  He is afebrile. 

Extremities:  Leg looked much better.  Very little dry skin, improving.  Very little redness.  Elida de jesus is down.  No open wounds.



Laboratory Data:  His laboratory data and pictures reviewed that the nursing staff sent me.



Assessment:  Cellulitis of the wound, left leg, improving, status post debridement.



Recommendation:  Lidex dressing as ordered and IV antibiotics as ordered.  Discharge planning.  Abdiaziz
w bharti in the [facility] upon discharge.





AS/MODL

DD:  03/23/2020 15:36:01Voice ID:  [id number]

DT:  03/23/2020 17:00:08Report ID:  [id number]

## 2020-03-23 NOTE — PN
Date of Progress Note:  03/23/2020



Subjective:  Patient was seen this morning for followup.  No new complaints, problems reported by him
.



Objective:  General:  Lying in bed in distress. 

Vital signs:  Reviewed. 

HEENT:  Unremarkable. 

Lung: S clear to auscultation. 

Cardiac:  Heart sounds normal. 

Abdomen:  Soft.  Bowel sounds normal.  No guarding, rigidity, tenderness, distention. 

Extremities:  Left leg edema remains unchanged from yesterday.  Dressing present over left leg betwee
n knee and foot.



Laboratory Data:  Sodium 141, potassium 3.9, chloride 106, bicarb 30, BUN 21, creatinine 1.07, glucos
e 91, magnesium 1.8.



Impression:  

1.Cellulitis, left leg, organism methicillin resistant Staphylococcus aureus.

2.Acute kidney injury, resolved.

3.Hypertension.

4.Anemia.



Plan:  We will go ahead and continue current antibiotics.  Social service consultation was requested 
to make arrangements for patient to have IV antibiotic which is vancomycin at home for 2 weeks.  Poss
ible discharge to go home in next 1 or 2 days.  We will repeat blood work tomorrow morning.  Physical
 therapy to help ambulate the patient and I 

will see him tomorrow for followup.  Lasix 40 mg IV x1 dose was ordered to be given today.





MARY/MODL

DD:  03/23/2020 18:57:34Voice ID:  [id number]

DT:  03/23/2020 20:18:42Report ID:  [id number]

## 2020-03-24 LAB
BUN BLD-MCNC: 19 MG/DL (ref 7–18)
GLUCOSE SERPLBLD-MCNC: 86 MG/DL (ref 74–106)
HCT VFR BLD CALC: 24.5 % (ref 39.6–49)
LYMPHOCYTES # SPEC AUTO: 0.4 K/UL (ref 0.7–4.9)
MAGNESIUM SERPL-MCNC: 1.8 MG/DL (ref 1.8–2.4)
PMV BLD: 7.1 FL (ref 7.6–11.3)
POTASSIUM SERPL-SCNC: 4 MMOL/L (ref 3.5–5.1)
RBC # BLD: 2.66 M/UL (ref 4.33–5.43)

## 2020-03-24 RX ADMIN — METOPROLOL TARTRATE SCH MG: 50 TABLET, FILM COATED ORAL at 21:10

## 2020-03-24 RX ADMIN — SODIUM FERRIC GLUCONATE COMPLEX IN SUCROSE SCH MLS: 12.5 INJECTION INTRAVENOUS at 09:30

## 2020-03-24 RX ADMIN — SODIUM CHLORIDE SCH MLS: 9 INJECTION, SOLUTION INTRAVENOUS at 21:11

## 2020-03-24 RX ADMIN — POTASSIUM CHLORIDE SCH MEQ: 10 TABLET, FILM COATED, EXTENDED RELEASE ORAL at 21:10

## 2020-03-24 RX ADMIN — ENOXAPARIN SODIUM SCH MG: 30 INJECTION SUBCUTANEOUS at 21:09

## 2020-03-24 RX ADMIN — METOPROLOL TARTRATE SCH MG: 50 TABLET, FILM COATED ORAL at 09:00

## 2020-03-24 RX ADMIN — SODIUM CHLORIDE SCH MLS: 9 INJECTION, SOLUTION INTRAVENOUS at 09:00

## 2020-03-24 RX ADMIN — HYDRALAZINE HYDROCHLORIDE SCH MG: 25 TABLET, FILM COATED ORAL at 13:24

## 2020-03-24 RX ADMIN — MUPIROCIN SCH APPL: 20 OINTMENT TOPICAL at 09:00

## 2020-03-24 RX ADMIN — SODIUM CHLORIDE SCH MLS: 9 INJECTION, SOLUTION INTRAVENOUS at 17:20

## 2020-03-24 RX ADMIN — HYDRALAZINE HYDROCHLORIDE SCH MG: 25 TABLET, FILM COATED ORAL at 09:00

## 2020-03-24 RX ADMIN — SODIUM CHLORIDE SCH MLS: 9 INJECTION, SOLUTION INTRAVENOUS at 01:00

## 2020-03-24 RX ADMIN — ENOXAPARIN SODIUM SCH MG: 30 INJECTION SUBCUTANEOUS at 09:00

## 2020-03-24 RX ADMIN — CLONIDINE HYDROCHLORIDE SCH MG: 0.3 TABLET ORAL at 09:00

## 2020-03-24 RX ADMIN — MUPIROCIN SCH APPL: 20 OINTMENT TOPICAL at 21:12

## 2020-03-24 RX ADMIN — FAMOTIDINE SCH MG: 10 INJECTION, SOLUTION INTRAVENOUS at 21:11

## 2020-03-24 RX ADMIN — CLONIDINE HYDROCHLORIDE SCH MG: 0.3 TABLET ORAL at 13:23

## 2020-03-24 RX ADMIN — TAMSULOSIN HYDROCHLORIDE SCH MG: 0.4 CAPSULE ORAL at 09:00

## 2020-03-24 RX ADMIN — HYDRALAZINE HYDROCHLORIDE SCH MG: 25 TABLET, FILM COATED ORAL at 21:09

## 2020-03-24 RX ADMIN — FAMOTIDINE SCH MG: 10 INJECTION, SOLUTION INTRAVENOUS at 09:00

## 2020-03-24 RX ADMIN — AMLODIPINE BESYLATE SCH MG: 5 TABLET ORAL at 09:00

## 2020-03-24 RX ADMIN — CLONIDINE HYDROCHLORIDE SCH MG: 0.3 TABLET ORAL at 21:10

## 2020-03-24 RX ADMIN — GUAIFENESIN AND DEXTROMETHORPHAN PRN ML: 100; 10 SYRUP ORAL at 23:30

## 2020-03-24 RX ADMIN — FLUOCINONIDE SCH APPL: 0.5 CREAM TOPICAL at 16:47

## 2020-03-24 RX ADMIN — POTASSIUM CHLORIDE SCH MEQ: 10 TABLET, FILM COATED, EXTENDED RELEASE ORAL at 09:00

## 2020-03-24 RX ADMIN — SPIRONOLACTONE SCH MG: 25 TABLET, FILM COATED ORAL at 09:00

## 2020-03-24 RX ADMIN — Medication SCH IU: at 09:00

## 2020-03-24 NOTE — PN
Date of Progress Note:  03/24/2020



Subjective:  The patient was admitted with acute kidney injury secondary to prerenal.  Patient also h
as hypomagnesemia.



Physical Examination:

Vital Signs:  When I saw the patient, blood pressure 161/81, pulse of 76. 

Chest:  Clear to auscultation. 

Heart:  S1, S2.  Regular. 

Abdomen:  Soft.  Nontender. 

Extremities:  Dressing on the left leg.  +1 edema. 

Neurologic:  Alert.  No focal.



Laboratory Data:  Sodium 141, potassium 4, bicarb 31, BUN 19, creatinine down to 1, calcium 8.6, magn
esium 1.8.



Current Medications:  The patient is on include; amlodipine 5 mg, clonidine 0.3 t.i.d., hydralazine, 
metoprolol, spironolactone 25 daily, Lasix 40, be receiving daily Pepcid, prednisone.



Assessment And Plan:  

1.Acute kidney injury secondary to prerenal/toxic acute tubular necrosis, recovered, resolved.  Curr
ently, has significant peripheral edema.  I am going to give the patient extra dose of Lasix and I wi
ll maintain the patient on Lasix daily.

2.Hypertension, controlled, optimal.  We will utilize the blood pressure for more diuresis given the
 peripheral edema.  We are going to try to wean off calcium channel blocker.

3.Hypokalemia, responding very well to current spironolactone dose.  We will continue.

4.Hypomagnesemia.  We will supplement.

5.Cellulitis, as by primary.





MA/KEVIN

DD:  03/24/2020 22:09:48Voice ID:  [id number]

DT:  03/24/2020 23:28:05Report ID:  [id number]

## 2020-03-24 NOTE — PN
Date of Progress Note:  03/24/2020



Subjective:  Patient was seen this morning for followup.  No new complaints or problems reported by wyatt ramos.  Lying in bed, not in distress.  He is ambulating well with physical therapy.  Reports that y
esterday he ambulated 300 feet.



Objective:  Vital Signs:  Reviewed. 

HEENT:  Unremarkable. 

Lungs:  Clear to auscultation. 

Heart:  Sounds normal. 

Abdomen:  Soft.  Bowel sounds normal.  No guarding, rigidity, tenderness, or distention. 

Extremities:  Left leg edema significantly better.  Skin discoloration from left thigh and left leg r
emains unchanged from yesterday.



Laboratory Data:  White count 4.4, hemoglobin 8.3, platelets 380.  Chemistry:  Sodium 141, potassium 
4, chloride 106, bicarb 31, BUN 19, creatinine 1.09, glucose 86, magnesium 1.8.



Impression:  

1.Cellulitis, left leg, organism methicillin-resistant Staphylococcus aureus.

2.Acute kidney injury.

3.Paroxysmal atrial fibrillation.

4.Hypertension.

5.Anemia.



Plan:  We will go ahead and continue current medications.  Continue current IV antibiotic.  Social Se zacarias is making arrangements for patient to go home with home IV antibiotic therapy.  Possible discha
rge to go home today or tomorrow.  Details and plan of treatment discussed with patient.  We will giv
e ahead and give 1 dose of Lasix 40 mg IV today.  Leg swelling is improving very well on a day-to-day
 

basis in the last few days.  Appropriate form was signed for patient to get a walker at home.





MARY/MODL

DD:  03/24/2020 17:56:21Voice ID:  [id number]

DT:  03/24/2020 18:35:25Report ID:  [id number]

## 2020-03-25 LAB
BUN BLD-MCNC: 21 MG/DL (ref 7–18)
GLUCOSE SERPLBLD-MCNC: 87 MG/DL (ref 74–106)
MAGNESIUM SERPL-MCNC: 2 MG/DL (ref 1.8–2.4)
POTASSIUM SERPL-SCNC: 4 MMOL/L (ref 3.5–5.1)

## 2020-03-25 RX ADMIN — GUAIFENESIN AND DEXTROMETHORPHAN PRN ML: 100; 10 SYRUP ORAL at 17:46

## 2020-03-25 RX ADMIN — SODIUM CHLORIDE SCH MLS: 9 INJECTION, SOLUTION INTRAVENOUS at 06:27

## 2020-03-25 RX ADMIN — SODIUM CHLORIDE SCH MLS: 9 INJECTION, SOLUTION INTRAVENOUS at 01:06

## 2020-03-25 RX ADMIN — MUPIROCIN SCH APPL: 20 OINTMENT TOPICAL at 21:43

## 2020-03-25 RX ADMIN — MUPIROCIN SCH APPL: 20 OINTMENT TOPICAL at 08:51

## 2020-03-25 RX ADMIN — HYDRALAZINE HYDROCHLORIDE SCH: 25 TABLET, FILM COATED ORAL at 11:50

## 2020-03-25 RX ADMIN — GUAIFENESIN AND DEXTROMETHORPHAN PRN ML: 100; 10 SYRUP ORAL at 05:50

## 2020-03-25 RX ADMIN — TAMSULOSIN HYDROCHLORIDE SCH MG: 0.4 CAPSULE ORAL at 08:47

## 2020-03-25 RX ADMIN — HYDRALAZINE HYDROCHLORIDE SCH MG: 25 TABLET, FILM COATED ORAL at 08:48

## 2020-03-25 RX ADMIN — ACETAMINOPHEN PRN MG: 500 TABLET, FILM COATED ORAL at 11:51

## 2020-03-25 RX ADMIN — SODIUM CHLORIDE SCH MLS: 9 INJECTION, SOLUTION INTRAVENOUS at 08:50

## 2020-03-25 RX ADMIN — SODIUM CHLORIDE SCH MLS: 9 INJECTION, SOLUTION INTRAVENOUS at 17:41

## 2020-03-25 RX ADMIN — CLONIDINE HYDROCHLORIDE SCH MG: 0.3 TABLET ORAL at 08:48

## 2020-03-25 RX ADMIN — SODIUM FERRIC GLUCONATE COMPLEX IN SUCROSE SCH MLS: 12.5 INJECTION INTRAVENOUS at 10:10

## 2020-03-25 RX ADMIN — FLUOCINONIDE SCH APPL: 0.5 CREAM TOPICAL at 08:51

## 2020-03-25 RX ADMIN — HYDRALAZINE HYDROCHLORIDE SCH MG: 25 TABLET, FILM COATED ORAL at 17:39

## 2020-03-25 RX ADMIN — CLONIDINE HYDROCHLORIDE SCH MG: 0.3 TABLET ORAL at 14:00

## 2020-03-25 RX ADMIN — METOPROLOL TARTRATE SCH MG: 50 TABLET, FILM COATED ORAL at 08:47

## 2020-03-25 RX ADMIN — ENOXAPARIN SODIUM SCH MG: 30 INJECTION SUBCUTANEOUS at 21:41

## 2020-03-25 RX ADMIN — FAMOTIDINE SCH MG: 10 INJECTION, SOLUTION INTRAVENOUS at 21:41

## 2020-03-25 RX ADMIN — CLONIDINE HYDROCHLORIDE SCH MG: 0.3 TABLET ORAL at 21:40

## 2020-03-25 RX ADMIN — Medication SCH IU: at 08:49

## 2020-03-25 RX ADMIN — FAMOTIDINE SCH MG: 10 INJECTION, SOLUTION INTRAVENOUS at 08:49

## 2020-03-25 RX ADMIN — SODIUM CHLORIDE SCH MLS: 9 INJECTION, SOLUTION INTRAVENOUS at 21:42

## 2020-03-25 RX ADMIN — ENOXAPARIN SODIUM SCH MG: 30 INJECTION SUBCUTANEOUS at 08:47

## 2020-03-25 NOTE — PN
Date of Progress Note:  03/25/2020



Subjective:  Patient was admitted with cellulitis, anasarca, acute kidney injury.  Patient being on d
iuresis.  Patient's blood pressure being poorly controlled.



Physical Examination:

Vital Signs:  Blood pressure 190/93, pulse of 70, afebrile.  Patient had good urine output as weight 
wise the patient lost in the last 2 days 5 pounds, stable from admission. 

Chest:  Decreased entry bilateral base. 

Heart:  S1, S2.  Regular. 

Abdomen:  Soft, nontender.  

Extremities:  Trace edema.  Dressing on the left leg. 

Neuro:  Alert, no focal.



Laboratory Data:  WBC 4.4, H and H 8.3/24.5, platelets 380.  Sodium 140, potassium 4, bicarb 30, BUN 
21, creatinine 1.2, calcium 8.5, magnesium of 2.



Current Medications:  The patient on include hydralazine 100 t.i.d., vancomycin, clindamycin, meropen
em, Flomax 0.4 daily, amlodipine 10 mg, metoprolol 100 b.i.d., clonidine 0.3 t.i.d., Lasix 80 daily, 
and vitamin E.



Assessment And Plan:  

1.Acute kidney injury secondary to prerenal, recovered, resolved.

2.Hypertension, not controlled.  There is disproportion in the kidney size 12/14.  I reviewed the CT
 scan with contrast with Radiology.  There is no sign for any renal artery stenosis.  I going to go a
head and discontinue amlodipine, place the patient on nifedipine, increase his hydralazine to 100 q.6
.  Continue clonidine.  We will add Cardura.  We will switch from metoprolol to carvedilol for better
 blood pressure control and we will follow up the patient.  I am going to go ahead and send for corti
sol, plasma renin activity.  We will consider adding angiotensin converting enzyme inhibitor if kidne
y function plateau as today his kidney function is slightly lower than yesterday.

3.Cellulitis.  Continue current antibiotic.  Vancomycin trough noted.  

4.Anasarca.  Continue diuresis.  I decreased the Lasix to 40 mg and we will follow up.





MA/MODL

DD:  03/25/2020 11:29:54Voice ID:  [id number]

DT:  03/25/2020 12:43:47Report ID:  [id number]

## 2020-03-25 NOTE — PN
Date of Progress Note:  03/25/2020



Please note this is a telemedicine dictation.



Subjective:  Patient is awake, alert, no complaints.



Objective:  Vitals Signs:  Stable.  He is afebrile. 

Extremities:  Examination of the left leg reveals marked improvement in the swelling; however, patien
t has dry skin and scaling of skin.  There is an area on the dorsum of the foot where he has lymphede
ma, fluid oozing from it.  There is no obvious open wound that can be visualized.



Laboratory Data:  Chemistry reviewed.



Assessment:  Cellulitis, lymphedema, left lower extremity with infected wounds.



Recommendations:  Continue IV antibiotics and will utilize A and D ointment for the dry skin and calc
ium alginate for the drainage area.  We will re-evaluate tomorrow.





AS/KEVIN

DD:  03/25/2020 18:12:00Voice ID:  [id number]

DT:  03/25/2020 21:40:46Report ID:  [id number]

## 2020-03-25 NOTE — RAD REPORT
EXAM DESCRIPTION:  R[facility] Single View3/25/2020 9:16 am

 

CLINICAL HISTORY:  Cough

 

COMPARISON:  March 13, 2020

 

FINDINGS:   The lungs appear clear of acute infiltrate. The heart is mildly enlarged

 

IMPRESSION:   No acute abnormalities displayed

## 2020-03-25 NOTE — PN
Date of Progress Note:  03/25/2020



Subjective:  Patient was seen this morning for followup.  He is having cough as 
of yesterday last night.  He was started on some cough medicine.



Objective:  Vital Signs:  Reviewed. 

HEENT:  Unremarkable. 

Lungs:  Clear to auscultation. 

Heart:  Sounds normal. 

Abdomen:  Soft.  Bowel sounds normal.  No guarding, rigidity, tenderness, or 
distention. 

Extremities:  No edema of right leg.  Left leg has significant edema still 
present of left thigh.  Edema from lower leg below the knee has improved.



Laboratory Data:  Sodium 141, potassium 4, chloride 105, bicarb 30, BUN 21, 
creatinine 1.28.  Yesterday, creatinine was 1.09, day before 1.07.



Impression:  

1.   Acute kidney injury, improved.

2.   Urinary tract infection, organism methicillin-resistant Staphylococcus 
aureus.

3.   Anemia.

4.   Prostate cancer.

5.   Hypertension.



Plan:  We will continue current Lovenox.  Continue iron supplement, 
antihypertensive medication, IV Lasix which is 80 mg daily.  We will 
discontinue spironolactone as I have seen slight deterioration of renal 
function and we will stop the spironolactone.  Stop potassium replacement.  We 
will monitor blood work tomorrow, get a chest x-ray done today, and continue 
current antibiotics.





MARY/MODL

DD:  03/25/2020 19:20:43   Voice ID:  [id number]

DT:  03/25/2020 21:34:26   Report ID:  [id number]

MTDD

## 2020-03-26 VITALS — TEMPERATURE: 97.7 F | DIASTOLIC BLOOD PRESSURE: 74 MMHG | SYSTOLIC BLOOD PRESSURE: 160 MMHG

## 2020-03-26 VITALS — OXYGEN SATURATION: 92 %

## 2020-03-26 LAB
BUN BLD-MCNC: 19 MG/DL (ref 7–18)
GLUCOSE SERPLBLD-MCNC: 89 MG/DL (ref 74–106)
HCT VFR BLD CALC: 24.6 % (ref 39.6–49)
LYMPHOCYTES # SPEC AUTO: 0.4 K/UL (ref 0.7–4.9)
MAGNESIUM SERPL-MCNC: 1.9 MG/DL (ref 1.8–2.4)
PMV BLD: 7.2 FL (ref 7.6–11.3)
POTASSIUM SERPL-SCNC: 3.8 MMOL/L (ref 3.5–5.1)
RBC # BLD: 2.69 M/UL (ref 4.33–5.43)

## 2020-03-26 RX ADMIN — MUPIROCIN SCH APPL: 20 OINTMENT TOPICAL at 08:55

## 2020-03-26 RX ADMIN — ENOXAPARIN SODIUM SCH MG: 30 INJECTION SUBCUTANEOUS at 08:52

## 2020-03-26 RX ADMIN — CLONIDINE HYDROCHLORIDE SCH MG: 0.3 TABLET ORAL at 08:53

## 2020-03-26 RX ADMIN — HYDRALAZINE HYDROCHLORIDE SCH MG: 25 TABLET, FILM COATED ORAL at 06:30

## 2020-03-26 RX ADMIN — FAMOTIDINE SCH MG: 10 INJECTION, SOLUTION INTRAVENOUS at 08:53

## 2020-03-26 RX ADMIN — TAMSULOSIN HYDROCHLORIDE SCH MG: 0.4 CAPSULE ORAL at 08:53

## 2020-03-26 RX ADMIN — GUAIFENESIN AND DEXTROMETHORPHAN PRN ML: 100; 10 SYRUP ORAL at 00:43

## 2020-03-26 RX ADMIN — HYDRALAZINE HYDROCHLORIDE SCH MG: 25 TABLET, FILM COATED ORAL at 00:44

## 2020-03-26 RX ADMIN — SODIUM CHLORIDE SCH MLS: 9 INJECTION, SOLUTION INTRAVENOUS at 08:54

## 2020-03-26 RX ADMIN — SODIUM CHLORIDE SCH: 9 INJECTION, SOLUTION INTRAVENOUS at 09:00

## 2020-03-26 RX ADMIN — GUAIFENESIN AND DEXTROMETHORPHAN PRN ML: 100; 10 SYRUP ORAL at 06:34

## 2020-03-26 RX ADMIN — Medication SCH IU: at 08:54

## 2020-03-26 RX ADMIN — SODIUM CHLORIDE SCH MLS: 9 INJECTION, SOLUTION INTRAVENOUS at 00:44

## 2020-03-26 NOTE — PN
Date of Progress Note:  03/26/2020



Subjective:  Patient was admitted with anasarca, acute kidney injury.  Patient has been diuresed very
 well.



Physical Examination:

Vital Signs:  When I saw the patient, blood pressure better controlled today down to 140, currently 1
60/74, pulse of 65. 

Chest:  Decreased entry, bilateral base. 

Heart:  S1, S2.  Regular. 

Abdomen:  Morbidly obese. 

Extremities:  Dressing on the left foot, trace edema on the right.



Laboratory Data:  WBC 4, H and H 8.3/24.6, platelets 313.  Sodium 139, potassium 3.9, bicarb 30, BUN 
19, creatinine 1.1, GFR of 66, magnesium 1.9.



Current Medications:  The patient on, include nifedipine 60 mg, carvedilol 25 b.i.d., clindamycin, Pe
pcid, Lasix 40 daily, meropenem, prednisone, and Flomax.



Assessment And Plan:  

1.Acute kidney injury, secondary to prerenal, toxic acute tubular necrosis, recovered, resolved.

2.Hypertension, better controlled.  Continue current medication.  We will follow up hormonal workup 
as outpatient.  Continue current diuresis.

3.Edema.  Continue Lasix.  Spironolactone was held.

4.Cellulitis.  We will follow up with the primary.  Patient cleared from the renal standpoint for di
scharge planning.





CHARO

DD:  03/26/2020 11:32:15Voice ID:  [id number]

DT:  03/26/2020 13:14:54Report ID:  [id number]

## 2020-03-26 NOTE — DS
Date of Discharge:  03/26/2020



Disposition:  Discharged to go home.



Physical Examination:

HEENT:  Unremarkable. 

Lungs:  Clear to auscultation. 

Heart:  Sounds normal. 

Abdomen:  Soft.  Bowel sounds normal.  No guarding, rigidity, tenderness, or 
distention. 

Extremities:  No edema of right leg.  Left leg edema present, but overall much 
better than before.  Patient still has edema of thigh and lower leg.  Redness 
of left leg and left thigh skin is significantly better than before.  Right leg 
has no edema or redness.



Laboratory Data:  Today, white count 4, hemoglobin 8.3, platelets 313.  
Chemistry today; sodium 139, potassium 3.8, chloride 104, bicarb 30, BUN 19, 
creatinine 1.10, glucose 89, magnesium 1.9.  Lowest hemoglobin was 6.6 on 03/18/
2020 and patient did not require any blood transfusion.  Repeat hemoglobin the 
same day came back 7.8.  When patient was first admitted on 03/11/2020, white 
count 10.2, hemoglobin 11.9, platelets 181.  Wound culture grew MRSA from left 
foot.  Initial creatinine was 2.87.  Highest creatinine was 3.60 and last 6 
days of the hospital stay, creatinine has come down to normal and remained 
normal.  Initial potassium was 3.1, lowest potassium 2.7, and last potassium 
today 4.  Initial magnesium 2, lowest magnesium 1.5, last magnesium today 1.8.  
Stool guaiac was negative.  Echocardiogram done during this hospitalization 
showed normal ejection fraction.



Hospital Course:  This is a 69-year-old very pleasant male patient who came 
into my office with complaints of fever, chills, feeling weak.  Please see 
dictated H and P for more information.  After patient was evaluated in the 
office, he was admitted to the hospital.  I was concerned about possibility of 
sepsis along with cellulitis of left lower extremity.  His blood culture was 
done before starting antibiotics and blood culture remained negative.  Patient 
had some blisters type of area on the left dorsum foot with some clear drainage 
and culture was sent from that which grew methicillin-resistant staphylococcus 
aureus.  Subsequently, he had multiple blisters over left lower extremity and 
Dr. Sevilla was consulted and he did a debridement procedure and culture was sent 
during that time and that also grew MRSA.  Initially, patient was on ceftriaxone
, then we added doxycycline, and then subsequently we discontinued both of 
those medications, started patient on vancomycin, clindamycin and meropenem 
combination, which he continued during this hospital stay.  His cellulitis has 
improved significantly.  Initially, he had low blood pressure, which has 
improved and he started to have his blood pressure on high side requiring 
frequent adjustment of antihypertensive medication, so we did have to make 
adjustment on his medications from time to time during this hospital stay.  He 
did have 1 episode of paroxysmal atrial fibrillation and that actually he 
responded well to IV metoprolol.  Subsequently, he has not had any more atrial 
fibrillation.  Patient received Lovenox throughout this hospital stay.  His 
acute renal failure problem resolved and his renal function is back to normal.  
Ultrasound was unremarkable, which is ultrasound of kidney and bladder.  
Nephrology consultation was requested.  Patient did have significant edema of 
left lower extremity and he responded well to IV Lasix therapy.  Physical 
Therapy was consulted.  Overall, his condition has improved and he is 
ambulating well.  Pain from left leg has improved significantly and today he 
was discharged to go home in stable condition.  He is having some cough in the 
last 2 to 3 days.  Chest x-ray yesterday was negative for any pneumonia.  He is 
responding well to cough medicine like Robitussin DM and he was continued upon 
discharge.



Final Diagnoses:  

1.   Cellulitis, left leg, organism methicillin-resistant Staphylococcus aureus.

2.   Acute kidney failure, resolved.

3.   Anemia, unspecified.

4.   Hypokalemia.

5.   Hypomagnesemia.

6.   Hypertension.

7.   Mixed hyperlipidemia.

8.   Lymphedema, legs.

9.   Morbid obesity.

10.   Prostate cancer.



Discharge Medications And Instructions:  

1.   Continue prior home medication.

2.   Vancomycin 2 g IV piggyback every 36 hours for 2 weeks.

3.   Home health nurse to flush PICC line per protocol, change PICC line 
dressing per protocol, and assist patient with IV antibiotic therapy and draw 
CBC, chem-7, and mag with every third dose of vancomycin.

4.   Follow up at my office in 1 week.

5.   Follow up with Dr. Sevilla in 1 to 2 weeks.





MARY/MODL

DD:  03/26/2020 16:35:59   Voice ID:  [id number]

DT:  03/26/2020 19:13:32   Report ID:  [id number]

RICK

## 2020-04-17 ENCOUNTER — HOSPITAL ENCOUNTER (INPATIENT)
Dept: HOSPITAL 97 - ER | Age: 70
LOS: 5 days | DRG: 871 | End: 2020-04-22
Attending: INTERNAL MEDICINE | Admitting: INTERNAL MEDICINE
Payer: COMMERCIAL

## 2020-04-17 VITALS — BODY MASS INDEX: 46.3 KG/M2

## 2020-04-17 DIAGNOSIS — Z03.818: ICD-10-CM

## 2020-04-17 DIAGNOSIS — A04.72: ICD-10-CM

## 2020-04-17 DIAGNOSIS — E83.42: ICD-10-CM

## 2020-04-17 DIAGNOSIS — N52.9: ICD-10-CM

## 2020-04-17 DIAGNOSIS — I47.2: ICD-10-CM

## 2020-04-17 DIAGNOSIS — E87.2: ICD-10-CM

## 2020-04-17 DIAGNOSIS — K56.7: ICD-10-CM

## 2020-04-17 DIAGNOSIS — A41.9: Primary | ICD-10-CM

## 2020-04-17 DIAGNOSIS — I48.0: ICD-10-CM

## 2020-04-17 DIAGNOSIS — Z79.52: ICD-10-CM

## 2020-04-17 DIAGNOSIS — J96.00: ICD-10-CM

## 2020-04-17 DIAGNOSIS — N17.9: ICD-10-CM

## 2020-04-17 DIAGNOSIS — E86.0: ICD-10-CM

## 2020-04-17 DIAGNOSIS — E86.9: ICD-10-CM

## 2020-04-17 DIAGNOSIS — M15.9: ICD-10-CM

## 2020-04-17 DIAGNOSIS — E78.2: ICD-10-CM

## 2020-04-17 DIAGNOSIS — L03.116: ICD-10-CM

## 2020-04-17 DIAGNOSIS — K57.32: ICD-10-CM

## 2020-04-17 DIAGNOSIS — D65: ICD-10-CM

## 2020-04-17 DIAGNOSIS — E66.01: ICD-10-CM

## 2020-04-17 DIAGNOSIS — R65.21: ICD-10-CM

## 2020-04-17 DIAGNOSIS — E87.6: ICD-10-CM

## 2020-04-17 LAB
ALBUMIN SERPL BCP-MCNC: 2.7 G/DL (ref 3.4–5)
ALP SERPL-CCNC: 93 U/L (ref 45–117)
ALT SERPL W P-5'-P-CCNC: 16 U/L (ref 12–78)
AMYLASE SERPL-CCNC: 13 U/L (ref 25–115)
AST SERPL W P-5'-P-CCNC: 10 U/L (ref 15–37)
BUN BLD-MCNC: 24 MG/DL (ref 7–18)
CKMB CREATINE KINASE MB: < 1 NG/ML (ref 0.3–3.6)
COHGB MFR BLDA: 1.4 % (ref 0–1.5)
GLUCOSE SERPLBLD-MCNC: 132 MG/DL (ref 74–106)
HCT VFR BLD CALC: 35 % (ref 39.6–49)
INR BLD: 1.3
LIPASE SERPL-CCNC: 35 U/L (ref 73–393)
LYMPHOCYTES # SPEC AUTO: 0.5 K/UL (ref 0.7–4.9)
MORPHOLOGY BLD-IMP: (no result)
OXYHGB MFR BLDA: 96.6 % (ref 94–97)
PMV BLD: 8 FL (ref 7.6–11.3)
POTASSIUM SERPL-SCNC: 2.8 MMOL/L (ref 3.5–5.1)
RBC # BLD: 3.79 M/UL (ref 4.33–5.43)
SAO2 % BLDA: 98.5 % (ref 92–98.5)
TROPONIN (EMERG DEPT USE ONLY): 0.04 NG/ML (ref 0–0.04)

## 2020-04-17 PROCEDURE — 87177 OVA AND PARASITES SMEARS: CPT

## 2020-04-17 PROCEDURE — 82550 ASSAY OF CK (CPK): CPT

## 2020-04-17 PROCEDURE — 87040 BLOOD CULTURE FOR BACTERIA: CPT

## 2020-04-17 PROCEDURE — 96374 THER/PROPH/DIAG INJ IV PUSH: CPT

## 2020-04-17 PROCEDURE — 96375 TX/PRO/DX INJ NEW DRUG ADDON: CPT

## 2020-04-17 PROCEDURE — 83615 LACTATE (LD) (LDH) ENZYME: CPT

## 2020-04-17 PROCEDURE — 83690 ASSAY OF LIPASE: CPT

## 2020-04-17 PROCEDURE — 82150 ASSAY OF AMYLASE: CPT

## 2020-04-17 PROCEDURE — 87088 URINE BACTERIA CULTURE: CPT

## 2020-04-17 PROCEDURE — 82805 BLOOD GASES W/O2 SATURATION: CPT

## 2020-04-17 PROCEDURE — 80053 COMPREHEN METABOLIC PANEL: CPT

## 2020-04-17 PROCEDURE — 86901 BLOOD TYPING SEROLOGIC RH(D): CPT

## 2020-04-17 PROCEDURE — 87045 FECES CULTURE AEROBIC BACT: CPT

## 2020-04-17 PROCEDURE — 87209 SMEAR COMPLEX STAIN: CPT

## 2020-04-17 PROCEDURE — 83735 ASSAY OF MAGNESIUM: CPT

## 2020-04-17 PROCEDURE — 94660 CPAP INITIATION&MGMT: CPT

## 2020-04-17 PROCEDURE — 76770 US EXAM ABDO BACK WALL COMP: CPT

## 2020-04-17 PROCEDURE — 84100 ASSAY OF PHOSPHORUS: CPT

## 2020-04-17 PROCEDURE — 87205 SMEAR GRAM STAIN: CPT

## 2020-04-17 PROCEDURE — 87324 CLOSTRIDIUM AG IA: CPT

## 2020-04-17 PROCEDURE — 86850 RBC ANTIBODY SCREEN: CPT

## 2020-04-17 PROCEDURE — 86900 BLOOD TYPING SEROLOGIC ABO: CPT

## 2020-04-17 PROCEDURE — 83605 ASSAY OF LACTIC ACID: CPT

## 2020-04-17 PROCEDURE — 84145 PROCALCITONIN (PCT): CPT

## 2020-04-17 PROCEDURE — 83010 ASSAY OF HAPTOGLOBIN QUANT: CPT

## 2020-04-17 PROCEDURE — 85025 COMPLETE CBC W/AUTO DIFF WBC: CPT

## 2020-04-17 PROCEDURE — 82553 CREATINE MB FRACTION: CPT

## 2020-04-17 PROCEDURE — 87804 INFLUENZA ASSAY W/OPTIC: CPT

## 2020-04-17 PROCEDURE — 80076 HEPATIC FUNCTION PANEL: CPT

## 2020-04-17 PROCEDURE — 84484 ASSAY OF TROPONIN QUANT: CPT

## 2020-04-17 PROCEDURE — 80061 LIPID PANEL: CPT

## 2020-04-17 PROCEDURE — 87425 ROTAVIRUS AG IA: CPT

## 2020-04-17 PROCEDURE — 82274 ASSAY TEST FOR BLOOD FECAL: CPT

## 2020-04-17 PROCEDURE — 93005 ELECTROCARDIOGRAM TRACING: CPT

## 2020-04-17 PROCEDURE — 94003 VENT MGMT INPAT SUBQ DAY: CPT

## 2020-04-17 PROCEDURE — 71045 X-RAY EXAM CHEST 1 VIEW: CPT

## 2020-04-17 PROCEDURE — 85610 PROTHROMBIN TIME: CPT

## 2020-04-17 PROCEDURE — 99285 EMERGENCY DEPT VISIT HI MDM: CPT

## 2020-04-17 PROCEDURE — 74176 CT ABD & PELVIS W/O CONTRAST: CPT

## 2020-04-17 PROCEDURE — 36415 COLL VENOUS BLD VENIPUNCTURE: CPT

## 2020-04-17 PROCEDURE — 87086 URINE CULTURE/COLONY COUNT: CPT

## 2020-04-17 PROCEDURE — 81015 MICROSCOPIC EXAM OF URINE: CPT

## 2020-04-17 PROCEDURE — 87070 CULTURE OTHR SPECIMN AEROBIC: CPT

## 2020-04-17 PROCEDURE — 74018 RADEX ABDOMEN 1 VIEW: CPT

## 2020-04-17 PROCEDURE — 87449 NOS EACH ORGANISM AG IA: CPT

## 2020-04-17 PROCEDURE — 89055 LEUKOCYTE ASSESSMENT FECAL: CPT

## 2020-04-17 PROCEDURE — 80048 BASIC METABOLIC PNL TOTAL CA: CPT

## 2020-04-17 PROCEDURE — 87081 CULTURE SCREEN ONLY: CPT

## 2020-04-17 PROCEDURE — 85730 THROMBOPLASTIN TIME PARTIAL: CPT

## 2020-04-17 PROCEDURE — 84132 ASSAY OF SERUM POTASSIUM: CPT

## 2020-04-17 PROCEDURE — 82248 BILIRUBIN DIRECT: CPT

## 2020-04-17 PROCEDURE — 94002 VENT MGMT INPAT INIT DAY: CPT

## 2020-04-17 PROCEDURE — 82947 ASSAY GLUCOSE BLOOD QUANT: CPT

## 2020-04-17 PROCEDURE — 87046 STOOL CULTR AEROBIC BACT EA: CPT

## 2020-04-17 NOTE — EDPHYS
Physician Documentation                                                                           

 [facility]                                                                 

Name: Zaheer Hensley                                                                                  

Age: 69 yrs                                                                                       

Sex: Male                                                                                         

: 1950                                                                                   

MRN: [id number]                                                                                   

Arrival Date: 2020                                                                          

Time: 19:25                                                                                       

Account#: [id number]                                                                            

Bed 30                                                                                            

Private MD:                                                                                       

ED Physician Kt Larkin                                                                     

HPI:                                                                                              

                                                                                             

23:56 This 69 yrs old  Male presents to ER via EMS with complaints of Shortness Of   tw4 

      Breath.                                                                                     

23:56 The patient has shortness of breath at rest. Onset: The symptoms/episode began/occurred tw4 

      yesterday. Duration: The symptoms are continuous, and are unchanged since they started.     

      The patient's shortness of breath has no apparent modifying factors. Severity of            

      symptoms: At their worst the symptoms were moderate in the emergency department the         

      symptoms are unchanged. The patient has not experienced similar symptoms in the past.       

                                                                                                  

Historical:                                                                                       

- Allergies:                                                                                      

19:44 No Known Allergies;                                                                     fc  

- PMHx:                                                                                           

19:44 Prostate Cancer; Hypertension; Staph Infection to left lower leg;                       fc  

- PSHx:                                                                                           

19:44 Partial thyroidectomy;                                                                  fc  

                                                                                                  

- Immunization history:: Last tetanus immunization: unknown, Flu vaccine is up to date.           

- Social history:: Smoking status: Patient denies any tobacco usage or history of.                

  Patient uses alcohol, occasionally.                                                             

                                                                                                  

                                                                                                  

ROS:                                                                                              

23:56 Constitutional: Negative for fever, chills, and weight loss, Eyes: Negative for injury, tw4 

      pain, redness, and discharge, Cardiovascular: Negative for chest pain, palpitations,        

      and edema.                                                                                  

23:56 Back: Negative for injury and pain, MS/Extremity: Negative for injury and deformity,        

      Skin: Negative for injury, rash, and discoloration.                                         

23:56 Respiratory: Positive for shortness of breath, on exertion.                                 

23:56 Abdomen/GI: Positive for diarrhea.                                                          

23:56 Neuro: Positive for weakness.                                                               

                                                                                                  

Exam:                                                                                             

23:56 Constitutional:  This is a well developed, well nourished patient who is awake, alert,  tw4 

      and in no acute distress. Head/Face:  Normocephalic, atraumatic. Chest/axilla:  Normal      

      chest wall appearance and motion.  Nontender with no deformity.  No lesions are             

      appreciated.                                                                                

23:56 Respiratory:  Lungs have equal breath sounds bilaterally, clear to auscultation and         

      percussion.  No rales, rhonchi or wheezes noted.  No increased work of breathing, no        

      retractions or nasal flaring. Abdomen/GI:  Soft, non-tender, with normal bowel sounds.      

      No distension or tympany.  No guarding or rebound.  No evidence of tenderness               

      throughout. Back:  No spinal tenderness.  No costovertebral tenderness.  Full range of      

      motion. Neuro:  Awake and alert, GCS 15, oriented to person, place, time, and               

      situation.  Cranial nerves II-XII grossly intact.  Motor strength 5/5 in all                

      extremities.  Sensory grossly intact.  Cerebellar exam normal.  Normal gait.                

23:56 Cardiovascular: Rate: tachycardic, actual rate is  134 bpm, Rhythm: regular, Pulses: no     

      pulse deficits are appreciated.                                                             

23:56 Musculoskeletal/extremity: Extremities: noted in the left shin: pain, swelling,             

      tenderness, ROM: no acute changes.                                                          

                                                                                                  

Vital Signs:                                                                                      

19:25  / 89; Pulse 134; Resp 22; Temp 100.7(O); Pulse Ox 96% on R/A; Weight 142.43 kg   fc  

      (R); Height 5 ft. 9 in. (175.26 cm) (R); Pain 0/10;                                         

22:00  / 80; Pulse 120; Resp 20; Pulse Ox 100% on R/A; Pain 0/10;                       ls4 

23:00  / 84; Pulse 116; Resp 22; Pulse Ox 100% on R/A; Pain 0/10;                       ls4 

19:25 Body Mass Index 46.37 (142.43 kg, 175.26 cm)                                            fc  

                                                                                                  

MDM:                                                                                              

19:42 Patient medically screened.                                                             tw4 

23:56 Differential diagnosis: Anemia Anxiety Reaction. Antibiotic administration: Levaquin    tw4 

      given, Rocephin and Zithromax given. Data reviewed: vital signs, nurses notes. Data         

      interpreted: Pulse oximetry: Interpretation:. Counseling: I had a detailed discussion       

      with the patient and/or guardian regarding: the historical points, exam findings, and       

      any diagnostic results supporting the discharge/admit diagnosis. Special discussion: I      

      discussed with the patient/guardian in detail that at this point there is no indication     

      for admission to the hospital. It is understood, however, that if the symptoms persist      

      or worsen the patient needs to return immediately for re-evaluation.                        

                                                                                                  

                                                                                             

19:44 Order name: ABG; Complete Time: :17                                                   tw4 

                                                                                             

19:44 Order name: Amylase, Serum; Complete Time: 21:13                                                                                                                                     

21:13 Interpretation: Within normal limits: SERJIO 13.                                                                                                                                        

19:44 Order name: Basic Metabolic Panel; Complete Time: 21:13                                                                                                                              

21:13 Interpretation: Normal except: ; K 2.8; GLUC 132; BUN 24; CRE 2.11; GFR 31.                                                                                                    

19:44 Order name: Blood Culture Adult (2)                                                                                                                                                  

19:44 Order name: CBC with Diff; Complete Time: 21:17                                                                                                                                      

21:13 Interpretation: WBC 18.6; RBC 3.79; HGB 11.8; HCT 35.0; LYM% 2.8; SAVANNAH% 90.7; RDW 16.7;  tw4 

      ; NEUT A 16.9; LYMA 0.5.                                                             

                                                                                             

19:44 Order name: Ckmb; Complete Time: 21:13                                                                                                                                               

21:14 Interpretation: Within normal limits: CKMB < 1.0.                                                                                                                                    

19:44 Order name: CPK; Complete Time: 21:13                                                                                                                                                

21:13 Interpretation: Normal except: CPK 30.                                                                                                                                               

19:44 Order name: Lactate; Complete Time: 21:13                                                                                                                                            

21:13 Interpretation: Abnormal: LAC 2.3.                                                                                                                                                   

19:44 Order name: LFT's; Complete Time: 21:13                                                                                                                                              

19:44 Order name: Lipase; Complete Time: 21:13                                                                                                                                             

21:14 Interpretation: Normal except: LIP 35.                                                                                                                                               

19:44 Order name: Procalcitonin; Complete Time: 21:13                                                                                                                                      

21:14 Interpretation: Within normal limits: Procalcitonin 0.50.                                                                                                                            

19:44 Order name: Protime (+inr); Complete Time: 21:13                                                                                                                                     

21:14 Interpretation: Normal except: PT 15.3.                                                                                                                                              

19:44 Order name: Ptt, Activated; Complete Time: 21:13                                                                                                                                     

21:14 Interpretation: Within normal limits: PTT 25.1.                                                                                                                                      

19:44 Order name: Troponin (emerg Dept Use Only); Complete Time: 21:13                        [facility] 

                                                                                             

21:14 Interpretation: Within normal limits: TROPED 0.04.                                      [facility] 

                                                                                             

19:44 Order name: Urine Microscopic Only                                                      [facility] 

                                                                                             

19:44 Order name: Chest Single View XRAY; Complete Time: 21:13                                                                                                                             

21:14 Interpretation: No acute disease.                                                       [facility] 

                                                                                             

19:44 Order name: COVID-19                                                                    [facility] 

                                                                                             

19:44 Order name: Flu; Complete Time: 21:13                                                   [facility] 

                                                                                             

19:44 Order name: Strep                                                                       [facility] 

                                                                                             

20:16 Order name: Rotavirus Antigen                                                           [facility] 

                                                                                             

20:16 Order name: Stool Culture                                                               [facility] 

                                                                                             

20:16 Order name: Ova And Parasites                                                           [facility] 

                                                                                             

20:16 Order name: Fecal Leukocyte Stain                                                       [facility] 

                                                                                             

20:16 Order name: Occult Blood                                                                [facility] 

                                                                                             

21:17 Order name: Manual Differential; Complete Time: 21:17                                   [facility]

                                                                                             

21:25 Order name: Throat Culture                                                              [facility]

                                                                                             

22:39 Order name: Lipid Profile                                                               [facility]

                                                                                             

22:39 Order name: Lipid Profile                                                               [facility]

                                                                                             

19:44 Order name: Cardiac monitoring; Complete Time: 20:09                                                                                                                                 

19:44 Order name: EKG - Nurse/Tech; Complete Time: 20:10                                                                                                                                   

19:44 Order name: IV Saline Lock - Large Bore; Complete Time: 20:10                                                                                                                        

19:44 Order name: Labs collected and sent; Complete Time: 20:10                                                                                                                            

19:44 Order name: O2 Per Protocol; Complete Time: 20:10                                                                                                                                    

19:44 Order name: O2 Sat Monitoring; Complete Time: 20:10                                                                                                                                  

19:44 Order name: Document PUI#                                                                                                                                                            

19:44 Order name: Droplet/Contact Precautions; Complete Time: 20:22                                                                                                                        

19:44 Order name: Labs collected and sent; Complete Time: 20:09                                                                                                                            

19:44 Order name: Notify [facility] [phone]                           [facility] 

                                                                                             

19:44 Order name: O2 Per Protocol; Complete Time: 20:09                                                                                                                                    

22:40 Order name: Respiratory Therapy Consult                                                 [facility]

                                                                                                  

EC:56 Rate is 134 beats/min. Rhythm is regular with Occasional PVCs. QRS Axis is Normal. NH   tw4 

      interval is normal. QRS interval is normal. QT interval is normal. No Q waves. T waves      

      are Inverted in lead aVL. ST Segment is depressed in leads II, III, aVF, <1mm. Clinical     

      impression: NSR w/ Non-specific ST/T Changes, Cardiac ischemia, and Sinus tachycardia.      

      Interpreted by me. Reviewed by me.                                                          

                                                                                                  

Administered Medications:                                                                         

20:11 Drug: NS 0.9% (30 ml/kg) 4300 ml Route: IV; Rate: bolus; Site: left antecubital;        ls4 

21:15 Drug: Rocephin - (cefTRIAXone) 1 grams Route: IVPB; Infused Over: 10 mins; Site: left   ls4 

      antecubital;                                                                                

21:22 Drug: Motrin 600 mg Route: PO;                                                          ls4 

21:22 Drug: Tylenol 1000 mg Route: PO;                                                        ls4 

21:25 Drug: LevaQUIN 500 mg Volume: 100 ml; Route: IVPB; Infused Over: 60 mins; Site: left    ls4 

      antecubital;                                                                                

22:11 Drug: Lopressor 5 mg Route: IVP; Site: left antecubital;                                ls4 

22:13 Drug: Potassium Effervescent Tablet 50 mEq Route: PO;                                   ls4 

                                                                                                  

                                                                                                  

Disposition:                                                                                      

20 21:16 Hospitalization ordered by Elieser Hansen for Inpatient Admission. Preliminary       

  diagnosis are Sepsis, unspecified organism, Dehydration, Hypokalemia, Acute                     

  kidney failure, unspecified.                                                                    

- Bed requested for Telemetry/MedSurg (Inpatient).                                                

- Status is Inpatient Admission.                                                              ls4 

- Condition is Stable.                                                                            

- Problem is new.                                                                                 

- Symptoms are unchanged.                                                                         

                                                                                                  

                                                                                                  

                                                                                                  

Signatures:                                                                                       

Dispatcher [employer]                           EDMS                                                 

Nara Matt RN                   RN                                                      

Kt Larkin MD MD   tw4                                                  

Liyah Macdonald RN                       RN   ls4                                                  

                                                                                                  

Corrections: (The following items were deleted from the chart)                                    

22:46 21:16 Hospitalization Ordered by Elieser Hansen MD for Inpatient Admission. Preliminary      

      diagnosis is Sepsis, unspecified organism; Dehydration; Hypokalemia; Acute kidney           

      failure, unspecified. Bed requested for Telemetry/MedSurg (Inpatient). Status is            

      Inpatient Admission. Condition is Stable. Problem is new. Symptoms are unchanged. tw4       

23:48 22:46 2020 21:16 Hospitalization Ordered by Elieser Hansen MD for Inpatient          ls4 

      Admission. Preliminary diagnosis is Sepsis, unspecified organism; Dehydration;              

      Hypokalemia; Acute kidney failure, unspecified. Bed requested for Telemetry/MedSurg         

      (Inpatient). Status is Inpatient Admission. Condition is Stable. Problem is new.            

      Symptoms are unchanged. fc                                                                  

                                                                                                  

**************************************************************************************************

## 2020-04-17 NOTE — RAD REPORT
EXAM DESCRIPTION:  Rosa Single View4/17/2020 8:22 pm

 

CLINICAL HISTORY:  sob

 

COMPARISON:  March 2020

 

FINDINGS:   The lungs appear clear of acute infiltrate. The heart is borderline enlarged

 

IMPRESSION:   No acute abnormalities displayed

## 2020-04-17 NOTE — ER
Nurse's Notes                                                                                     

 [facility]                                                                 

Name: Zaheer Hensley                                                                                  

Age: 69 yrs                                                                                       

Sex: Male                                                                                         

: 1950                                                                                   

MRN: [id number]                                                                                   

Arrival Date: 2020                                                                          

Time: 19:25                                                                                       

Account#: [id number]                                                                            

Bed 30                                                                                            

Private MD:                                                                                       

Diagnosis: Sepsis, unspecified organism;Dehydration;Hypokalemia;Acute kidney failure, unspecified 

                                                                                                  

Presentation:                                                                                     

                                                                                             

19:25 Method Of Arrival: EMS: [facility]                                                       fc  

19:25 Chief complaint: EMS states: that they were toned for pt having nausea/vomiting x 4     fc  

      days, SOB on exertion and fever (mas 100.4) . Pt also has hx of staph infection to left     

      lower leg that he is being treated for. Coronavirus screen: Surgical mask placed on         

      patient. Patient moved to private room, placed in contact and droplet isolation with        

      eye protection until further assessment. Patient denies a cough. Patient reports            

      shortness of breath or difficulty breathing. Patient reports a measured and/or              

      subjective temperature greater than 100.4F. Patient denies travel on a cruise ship or       

      to a country the [facility] currently lists as an affected area. Patient denies contact with       

      known and/or suspected case of COVID-19. Ebola Screen: Patient negative for fever           

      greater than or equal to 101.5 degrees Fahrenheit, and additional compatible Ebola          

      Virus Disease symptoms Patient denies exposure to infectious person. Patient denies         

      travel to an Ebola-affected area in the 21 days before illness onset. Initial Sepsis        

      Screen: Does the patient meet any 2 criteria?. Initial Sepsis Screen: Does the patient      

      meet any 2 criteria? RR > 20 per min. HR > 90 bpm. Yes Does the patient have a              

      suspected source of infection? Yes: Productive cough/pneumonia Skin breakdown/wound If      

      YES to both, name of provider notified: Kt Larkin MD Risk Assessment: Do you want     

      to hurt yourself or someone else? Patient reports no desire to harm self or others.         

      Onset of symptoms was 2020. Care prior to arrival: Medication(s) given:           

      Normal saline infusion, 200 ml IV initiated. 20 GA, in the left antecubital area.           

      Transition of care: patient was not received from another setting of care.                  

19:25 Acuity: DENISHA 2                                                                           fc  

                                                                                                  

Triage Assessment:                                                                                

19:25 General: Appears distressed, uncomfortable, ill, obese, Behavior is cooperative,        ls4 

      anxious.                                                                                    

19:25 Pain: Denies pain. Neuro: Level of Consciousness is awake, alert, obeys commands,       ls4 

      Oriented to person, place, time, situation. Cardiovascular: Capillary refill < 3            

      seconds Clubbing of nail beds is absent Patient's skin is warm and dry. Rhythm is sinus     

      tachycardia Chest pain is denied. Cardiovascular: Edema is 3+ to left midcalf, left         

      ankle and left foot Parent/caregiver reports patient has had no cardiovascular              

      symptoms. Cardiovascular: Pulses are palpable in right radial artery, right posterior       

      tibial artery, left radial artery and left posterior tibial artery. Respiratory:            

      Reports shortness of breath at rest on exertion since 4 days labored breathing since 4      

      days Onset: The symptoms/episode began/occurred gradually, the patient has severe           

      shortness of breath. GI: Reports diarrhea, since 4 days. : No signs and/or symptoms       

      were reported regarding the genitourinary system. Derm: left lower leg cellulitis,          

      healing as per patient. Musculoskeletal: Reports weakness in generalized.                   

                                                                                                  

Historical:                                                                                       

- Allergies:                                                                                      

19:44 No Known Allergies;                                                                     fc  

- PMHx:                                                                                           

19:44 Prostate Cancer; Hypertension; Staph Infection to left lower leg;                       fc  

- PSHx:                                                                                           

19:44 Partial thyroidectomy;                                                                  fc  

                                                                                                  

- Immunization history:: Last tetanus immunization: unknown, Flu vaccine is up to date.           

- Social history:: Smoking status: Patient denies any tobacco usage or history of.                

  Patient uses alcohol, occasionally.                                                             

                                                                                                  

                                                                                                  

Screenin:25 Abuse screen: Denies threats or abuse. Nutritional screening: No deficits noted.        fc  

      Tuberculosis screening: No symptoms or risk factors identified. Fall Risk Fall in past      

      12 months (25 points). Secondary diagnosis (15 points) impaired mobility, IV access (20     

      points). Ambulatory Aid- Crutches/Cane/Walker (15 pts). Gait- Weak (10 pts.). Mental        

      Status- Overestimates/Forgets Limitations (15 pts.). Total Otero Fall Scale indicates       

      High Risk Score (45 or more points). Fall prevention measures have been instituted.         

      Side Rails Up X 2 Placed Close to Nursing Station Frequent Obs/Assessments Occuring As      

      available patient and family educated on Fall Prevention Program and Strategies.            

                                                                                                  

Assessment:                                                                                       

20:27 Cardiovascular: Denies chest pain, Rhythm is sinus tachycardia Chest pain is denied.    ls4 

      Respiratory: Airway is patent Respiratory effort is labored, Respiratory pattern is         

      regular, symmetrical, tachypnea Breath sounds are diminished bilaterally.                   

                                                                                                  

Vital Signs:                                                                                      

19:25  / 89; Pulse 134; Resp 22; Temp 100.7(O); Pulse Ox 96% on R/A; Weight 142.43 kg   fc  

      (R); Height 5 ft. 9 in. (175.26 cm) (R); Pain 0/10;                                         

22:00  / 80; Pulse 120; Resp 20; Pulse Ox 100% on R/A; Pain 0/10;                       ls4 

23:00  / 84; Pulse 116; Resp 22; Pulse Ox 100% on R/A; Pain 0/10;                       ls4 

19:25 Body Mass Index 46.37 (142.43 kg, 175.26 cm)                                            fc  

                                                                                                  

ED Course:                                                                                        

19:25 Patient arrived in ED.                                                                  ds1 

19:25 Arm band placed on Patient placed in an exam room, on a stretcher.                      fc  

19:25 Patient has correct armband on for positive identification. Placed in gown. Bed in low  fc  

      position. Call light in reach. Side rails up X2. Cardiac monitor on. Pulse ox on. NIBP      

      on.                                                                                         

19:25 No provider procedures requiring assistance completed. Maintain EMS IV. Dressing        fc  

      intact. Good blood return noted. Site clean \T\ dry. Gauge \T\ site: 20 gauge to left a/c.  

19:27 Pillow given. PO fluids given. Verbal reassurance given. assisted to commode. Droplet   ls4 

      isolation initiated.                                                                        

19:38 Triage completed.                                                                       fc  

19:42 Kt Larkin MD is Attending Physician.                                            tw4 

20:00 Initial lab(s) drawn, by me, sent to lab. First set of blood cultures drawn by me,      ls4 

      Second set of blood cultures drawn by me, EKG done, by ED staff, reviewed by Kt Larkin MD Flu and/or RSV swab sent to lab. Strep swab sent to lab. ABG drawn. by RT         

      staff, on room air. X-ray(s) taken. covid complete.                                         

20:09 Liyah Macdonald, RN is Primary Nurse.                                                     ls4 

20:21 Chest Single View XRAY Sent.                                                            ls4 

20:22 Chest Single View XRAY In Process Unspecified.                                          EDMS

21:15 Elieser Hansen MD is Hospitalizing Provider.                                             tw4 

23:02 Patient admitted, IV remains in place. Patient maintains SpO2 saturation greater than   ls4 

      95% on room air.                                                                            

23:04 Report given to Bianca ASKEW 4th floor.                                                    ls4 

                                                                                                  

Administered Medications:                                                                         

20:11 Drug: NS 0.9% (30 ml/kg) 4300 ml Route: IV; Rate: bolus; Site: left antecubital;        ls4 

21:15 Drug: Rocephin - (cefTRIAXone) 1 grams Route: IVPB; Infused Over: 10 mins; Site: left   ls4 

      antecubital;                                                                                

21:22 Drug: Motrin 600 mg Route: PO;                                                          ls4 

21:22 Drug: Tylenol 1000 mg Route: PO;                                                        ls4 

21:25 Drug: LevaQUIN 500 mg Volume: 100 ml; Route: IVPB; Infused Over: 60 mins; Site: left    ls4 

      antecubital;                                                                                

22:11 Drug: Lopressor 5 mg Route: IVP; Site: left antecubital;                                ls4 

22:13 Drug: Potassium Effervescent Tablet 50 mEq Route: PO;                                   ls4 

                                                                                                  

                                                                                                  

Intake:                                                                                           

19:06 IV: 500ml; Total: 500ml.                                                                ls4 

                                                                                                  

Outcome:                                                                                          

21:16 Decision to Hospitalize by Provider.                                                    tw4 

23:01 Admitted to Tele accompanied by tech, room 413, with chart, Report called to  bianca askew ls4 

                                                                                                  

23:01 Condition: stable                                                                           

23:01 Instructed on the need for admit.                                                           

23:48 Patient left the ED.                                                                    ls4 

                                                                                                  

Signatures:                                                                                       

Dispatcher [employer]                           EDMS                                                 

Nara Matt RN                   RN   [facility]                                ds1                                                  

Kt Larkin MD MD   tw4                                                  

Liyah Macdonald RN RN   ls4                                                  

                                                                                                  

Corrections: (The following items were deleted from the chart)                                    

19:38 19:30 Chief complaint: [facility]  

19:40 19:25 142.43 kg; Height 5 ft. 9 in.; BMI: 46.3; Pain 0/10; [facility]  

23:14 21:20 Rocephin - (cefTRIAXone) 1 grams IVPB in left antecubital over 10 mins ls4        ls4 

                                                                                                  

**************************************************************************************************

## 2020-04-18 LAB
ALBUMIN SERPL BCP-MCNC: 2.1 G/DL (ref 3.4–5)
ALP SERPL-CCNC: 92 U/L (ref 45–117)
ALT SERPL W P-5'-P-CCNC: 11 U/L (ref 12–78)
AST SERPL W P-5'-P-CCNC: 7 U/L (ref 15–37)
BUN BLD-MCNC: 23 MG/DL (ref 7–18)
BUN BLD-MCNC: 28 MG/DL (ref 7–18)
C DIFF GDH + TOXINS A+B STL QL IA.RAPID: (no result)
GLUCOSE SERPLBLD-MCNC: 122 MG/DL (ref 74–106)
GLUCOSE SERPLBLD-MCNC: 171 MG/DL (ref 74–106)
HCT VFR BLD CALC: 35.1 % (ref 39.6–49)
HDLC SERPL-MCNC: 30 MG/DL (ref 40–60)
LDLC SERPL CALC-MCNC: 60 MG/DL (ref ?–130)
LYMPHOCYTES # SPEC AUTO: 0.4 K/UL (ref 0.7–4.9)
MAGNESIUM SERPL-MCNC: 1.6 MG/DL (ref 1.8–2.4)
PMV BLD: 7.8 FL (ref 7.6–11.3)
POTASSIUM SERPL-SCNC: 3 MMOL/L (ref 3.5–5.1)
POTASSIUM SERPL-SCNC: 4.1 MMOL/L (ref 3.5–5.1)
RBC # BLD: 3.81 M/UL (ref 4.33–5.43)
UA COMPLETE W REFLEX CULTURE PNL UR: (no result)

## 2020-04-18 RX ADMIN — HEPARIN SODIUM SCH UNIT: 5000 INJECTION, SOLUTION INTRAVENOUS; SUBCUTANEOUS at 11:00

## 2020-04-18 RX ADMIN — HYDROCORTISONE SODIUM SUCCINATE SCH MG: 100 INJECTION, POWDER, FOR SOLUTION INTRAMUSCULAR; INTRAVENOUS at 17:51

## 2020-04-18 RX ADMIN — HYDROCORTISONE SODIUM SUCCINATE SCH MG: 100 INJECTION, POWDER, FOR SOLUTION INTRAMUSCULAR; INTRAVENOUS at 23:58

## 2020-04-18 RX ADMIN — FAMOTIDINE SCH MG: 10 INJECTION, SOLUTION INTRAVENOUS at 11:01

## 2020-04-18 RX ADMIN — SODIUM CHLORIDE SCH MLS: 0.9 INJECTION, SOLUTION INTRAVENOUS at 11:13

## 2020-04-18 RX ADMIN — METRONIDAZOLE SCH MLS: 500 INJECTION, SOLUTION INTRAVENOUS at 16:40

## 2020-04-18 RX ADMIN — HEPARIN SODIUM SCH UNIT: 5000 INJECTION, SOLUTION INTRAVENOUS; SUBCUTANEOUS at 20:48

## 2020-04-18 RX ADMIN — FAMOTIDINE SCH MG: 10 INJECTION, SOLUTION INTRAVENOUS at 20:48

## 2020-04-18 RX ADMIN — SODIUM CHLORIDE SCH: 0.9 INJECTION, SOLUTION INTRAVENOUS at 19:00

## 2020-04-18 RX ADMIN — HYDROCORTISONE SODIUM SUCCINATE SCH: 100 INJECTION, POWDER, FOR SOLUTION INTRAMUSCULAR; INTRAVENOUS at 12:00

## 2020-04-18 RX ADMIN — SODIUM CHLORIDE SCH MLS: 0.9 INJECTION, SOLUTION INTRAVENOUS at 23:59

## 2020-04-18 RX ADMIN — VANCOMYCIN HYDROCHLORIDE SCH MG: 250 CAPSULE ORAL at 23:58

## 2020-04-18 RX ADMIN — POTASSIUM CHLORIDE SCH MLS: 200 INJECTION, SOLUTION INTRAVENOUS at 15:00

## 2020-04-18 RX ADMIN — Medication SCH ML: at 09:03

## 2020-04-18 RX ADMIN — VANCOMYCIN HYDROCHLORIDE SCH MG: 250 CAPSULE ORAL at 12:37

## 2020-04-18 RX ADMIN — METRONIDAZOLE SCH MLS: 500 INJECTION, SOLUTION INTRAVENOUS at 11:00

## 2020-04-18 RX ADMIN — HYDROCORTISONE SODIUM SUCCINATE SCH MG: 100 INJECTION, POWDER, FOR SOLUTION INTRAMUSCULAR; INTRAVENOUS at 10:51

## 2020-04-18 RX ADMIN — POTASSIUM CHLORIDE SCH MLS: 200 INJECTION, SOLUTION INTRAVENOUS at 11:15

## 2020-04-18 RX ADMIN — ONDANSETRON PRN MG: 2 INJECTION INTRAMUSCULAR; INTRAVENOUS at 09:03

## 2020-04-18 RX ADMIN — ONDANSETRON PRN MG: 2 INJECTION INTRAMUSCULAR; INTRAVENOUS at 01:57

## 2020-04-18 RX ADMIN — VANCOMYCIN HYDROCHLORIDE SCH MG: 250 CAPSULE ORAL at 17:51

## 2020-04-18 RX ADMIN — Medication SCH ML: at 20:48

## 2020-04-18 NOTE — HP
Date of Admission:  2020



Chief Complaint:  Abdominal pain, nausea, vomiting, diarrhea, and feeling weak.



History Of Present Illness:  This is a 69-year-old male patient who came into 
emergency room last night with complaints of some abdominal discomfort, more 
like achy feeling in abdomen, associated with nausea, vomiting, and multiple 
episodes of diarrhea.  He is feeling very weak with all these complaints.  
Describes his diarrhea as loose watery stool several times a day, vomiting with 
lesser frequency.  Has not had good appetite, not able to keep much food or 
liquids down, but appetite is also very poor.  After he came into emergency 
room, he was evaluated and admitted to the hospital.  Denies any blood in stool.
 Denies any hematemesis.



Allergies:  NO KNOWN ALLERGIES.



Medications:  List reviewed.



Review of Systems:

Constitutional:  As mentioned above. 

GI:  As mentioned above. 



All other systems reviewed and negative.



Past Medical History:  Significant for hypertension, mixed hyperlipidemia, 
prostate cancer, erectile dysfunction, lymphedema of legs, osteoarthritis, and 
recent hospital admission with left leg cellulitis and acute kidney injury.  
Cellulitis was due to MRSA.



Past Surgical History:  Partial thyroidectomy in , due to goiter and was in 
form of removal of left thyroid lobe.



Family History:  Father , had coronary artery disease, hypertension, and 
abdominal aortic aneurysm.  Mother , had Parkinson disease.



Social History:  Negative for smoking.  Use of alcohol, occasional



Physical Examination:

Vital Signs:  Initial temperature was 100.7 when he first came into emergency 
room, pulse was 134, respiratory rate 22, blood pressure 130/89, oxygen 
saturation . Pulse 138, respiratory rate 18, blood pressure 124/75, and 
last systolic blood pressure was 80. 

General:  Patient appears very weak, and appears very pale.  Not in any 
distress. 

HEENT:  Head atraumatic, normocephalic.  Conjunctivae nonerythematous.  Sclerae 
white.  Mouth, no thrush or edema noted.  Ears/Nose, no mass, lesion, discharge 
noted. 

Neck:  Supple. No JVD, lymph nodes, bruit, thyromegaly noted. 

Lungs:  Bilateral good equal air entry. Clear to auscultation. No rhonchi.  No 
rales. 

Heart:  Normal heart sounds, no murmur or gallop. 

Abdomen:  Soft, bowel sounds normal. No guarding, rigidity, tenderness, mass, 
hepatosplenomegaly, distention, or bruit noted. 

Extremities:  Left leg exam shows very trace edema involving lower leg and left 
foot, but no open wound.  Has some striae, which is building off from his foot 
and no open area.  No evidence of cellulitis.  Skin has slightly brownish 
discoloration and his entire left lower extremity looks significantly better 
than what it was compared to last hospital admission.  

Skin:  No rash, ulcer, cellulitis. 

Lymphatics:  No lymph node enlargement in neck, supraclavicular, infraclavicular
region. 

Neuro:  No focal neurological deficit. 

Chest:  Unremarkable. 

External Genitalia:  Deferred. 

Rectal:  Deferred.



Laboratory Data:  Initial white count yesterday evening was 18.6, hemoglobin 
11.8, platelets 283.  This morning; white count 22.5, hemoglobin 11.9, platelets
257.  INR 1.30.  Blood gas; pH 7.56, pCO2 24.3, PO2 104, saturation 98.5%.  
Initial sodium 133, potassium 2.8, chloride 98, bicarb 25, BUN 24, creatinine 
2.11, glucose 132.  Lactic acid 2.3.  Liver function tests unremarkable, 
procalcitonin 0.5.  This morning, sodium 136, potassium 3, chloride 105, bicarb 
22, BUN 2.22, glucose 122, magnesium 1.6.  Urinalysis; 10-20 wbc's, bacteria 
more than 50.  Stool C diff pending. COVID-19 test negative.  Stool occult blood
positive.  Streptococcal screen and influenza A and B screen negative.  Chest x-
ray, no cardiopulmonary changes.



Impression:  

1.   Probable Clostridium difficile colitis.

2.   Acute kidney injury.

3.   Volume depletion.

4.   Chronic steroid therapy, rule out adrenal insufficiency.

5.   Prostate cancer.

6.   Hypertension.

7.   Mixed hyperlipidemia.

8.   Hypokalemia.

9.   Hypomagnesemia.

10.   Osteoarthritis, multiple sites.



Plan:  We will go ahead and admit him to hospital for further evaluation and 
management of this problem.  Patient is appropriate for inpatient and is 
expected to spend 2 midnights in hospital.  We will not give any of his usual 
home medications at this point.  Give IV Pepcid 20 mg twice a day as he may have
some stress-induced gastritis at this point.  We will continue to monitor his 
hemoglobin.  There was no evidence of any acute GI bleeding.  Stool occult blood
positive.  This is likely due to his underlying colitis problem.  At this point,
we were concerned about Clostridium difficile colitis.  Patient was in the 
hospital recently with left leg cellulitis problem and this was due to MRSA and 
he did receive IV vancomycin in the hospital and even at home, but he also had 
received other antibiotics including Levaquin and clindamycin as part of 
treatment for his extensive left leg cellulitis due to methicillin-resistant 
Staphylococcus aureus.  He is also on chronic steroid therapy, prednisone 5 mg 
twice a day along with his chemotherapy for prostate cancer, and at this point, 
we need to keep in mind about possibility of secondary adrenal insufficiency 
problem.  We will give him IV fluid boluses per order.  I have given him 500 mL 
bolus soon after I saw him, and his systolic blood pressure was 80 after the 
bolus was completed, so we will give another 500 mL bolus and keep monitoring 
blood pressure and as it becomes necessary, we will continue to use IV fluid 
bolus.  After giving adequate amount of fluid, we will consider whether he needs
vasopressor medication or not and in that case, we may have to move him to ICU 
if that is necessary.  IV steroid hydrocortisone 100 mg every 6 hours was 
ordered and he already received first dose after I saw him.  Heparin 5000 unit 
every 12 hours was ordered for DVT prophylaxis.  Fall precaution was ordered.  
We will replace electrolytes per protocol.  Flagyl was ordered 500 mg IV every 8
hours and vancomycin 250 mg p.o. every 6 hours as needed.  The patient may have 
underlying urinary tract infection on basis of urinalysis, but he does not have 
any definite symptoms of urinary tract infection.  So at this point, we will not
give any antibiotics for that purpose.  He already has received multiple 
different IV antibiotic in emergency room last night and we 

will just follow up on his urine culture.  Plan of treatment discussed with him.
 We will start him on clear liquid diet.





MARY/MODL

DD:  2020 11:52:31   Voice ID:  [id number]

RICK

## 2020-04-18 NOTE — RAD REPORT
EXAM DESCRIPTION:  CT - Abdomen   Pelvis Wo Contrast - 4/18/2020 10:33 am

 

CLINICAL HISTORY:  Abdominal pain.

abd distention, vomiting

 

COMPARISON:  Abdomen   Pelvis W Contrast dated 2/13/2020

 

TECHNIQUE:  CT imaging of the abdomen and pelvis was performed without contrast. Solid organ, bowel a
nd vascular assessment is limited due to lack of IV and oral contrast.

 

All CT scans are performed using dose optimization technique as appropriate and may include automated
 exposure control or mA/KV adjustment according to patient size.

 

FINDINGS:  The lower lung fields are clear.

 

Noncontrast liver assessment shows no biliary dilatation or aggressive hepatic lesion. Several low-de
nsity splenic lesions are again seen, appearing unchanged since comparative study.The pancreas, adren
al glands right kidney within normal limits. Exophytic left renal cyst is present. No left-sided hydr
onephrosis.

 

There is quite significant thickening of the colon seen greatest involving the cecum and ascending co
shabbir. Mild free fluid is seen around the hepatic and splenic edges. Edematous appearance to the centra
l abdominal mesenteric fat also present.  The appendix is normal. Mild adenopathy along the left dee dee
c chain and left para-aortic location appears unchanged since prior study.

 

The osseous structures are within normal limits.

 

IMPRESSION:  Moderate to significant colitis pattern is seen. Mild free fluid in the upper abdomen is
 present.

 

A limited non-contrast examination was performed as detailed.

## 2020-04-19 LAB
ALBUMIN SERPL BCP-MCNC: 1.6 G/DL (ref 3.4–5)
ALBUMIN SERPL BCP-MCNC: 1.7 G/DL (ref 3.4–5)
ALP SERPL-CCNC: 104 U/L (ref 45–117)
ALP SERPL-CCNC: 89 U/L (ref 45–117)
ALT SERPL W P-5'-P-CCNC: 16 U/L (ref 12–78)
ALT SERPL W P-5'-P-CCNC: 16 U/L (ref 12–78)
ANISOCYTOSIS BLD QL: (no result)
AST SERPL W P-5'-P-CCNC: 14 U/L (ref 15–37)
AST SERPL W P-5'-P-CCNC: 15 U/L (ref 15–37)
BUN BLD-MCNC: 30 MG/DL (ref 7–18)
BUN BLD-MCNC: 35 MG/DL (ref 7–18)
BURR CELLS BLD QL SMEAR: (no result)
GLUCOSE SERPLBLD-MCNC: 167 MG/DL (ref 74–106)
GLUCOSE SERPLBLD-MCNC: 174 MG/DL (ref 74–106)
HCT VFR BLD CALC: 41.6 % (ref 39.6–49)
HCT VFR BLD CALC: 43.3 % (ref 39.6–49)
LYMPHOCYTES # SPEC AUTO: 0.9 K/UL (ref 0.7–4.9)
LYMPHOCYTES # SPEC AUTO: 1.1 K/UL (ref 0.7–4.9)
MAGNESIUM SERPL-MCNC: 2.1 MG/DL (ref 1.8–2.4)
MAGNESIUM SERPL-MCNC: 2.1 MG/DL (ref 1.8–2.4)
MORPHOLOGY BLD-IMP: (no result)
PMV BLD: 8.2 FL (ref 7.6–11.3)
PMV BLD: 8.2 FL (ref 7.6–11.3)
POTASSIUM SERPL-SCNC: 4.1 MMOL/L (ref 3.5–5.1)
POTASSIUM SERPL-SCNC: 4.2 MMOL/L (ref 3.5–5.1)
RBC # BLD: 4.48 M/UL (ref 4.33–5.43)
RBC # BLD: 4.69 M/UL (ref 4.33–5.43)
TOXIC GRANULES BLD QL SMEAR: (no result)

## 2020-04-19 RX ADMIN — SODIUM CHLORIDE SCH MLS: 0.9 INJECTION, SOLUTION INTRAVENOUS at 19:23

## 2020-04-19 RX ADMIN — METRONIDAZOLE SCH MLS: 500 INJECTION, SOLUTION INTRAVENOUS at 18:07

## 2020-04-19 RX ADMIN — SODIUM CHLORIDE SCH MLS: 0.9 INJECTION, SOLUTION INTRAVENOUS at 23:43

## 2020-04-19 RX ADMIN — HYDROCORTISONE SODIUM SUCCINATE SCH MG: 100 INJECTION, POWDER, FOR SOLUTION INTRAMUSCULAR; INTRAVENOUS at 23:44

## 2020-04-19 RX ADMIN — HEPARIN SODIUM SCH UNIT: 5000 INJECTION, SOLUTION INTRAVENOUS; SUBCUTANEOUS at 20:35

## 2020-04-19 RX ADMIN — HYDROCORTISONE SODIUM SUCCINATE SCH MG: 100 INJECTION, POWDER, FOR SOLUTION INTRAMUSCULAR; INTRAVENOUS at 06:25

## 2020-04-19 RX ADMIN — ONDANSETRON PRN MG: 2 INJECTION INTRAMUSCULAR; INTRAVENOUS at 18:11

## 2020-04-19 RX ADMIN — FAMOTIDINE SCH MG: 10 INJECTION, SOLUTION INTRAVENOUS at 20:35

## 2020-04-19 RX ADMIN — SODIUM CHLORIDE, SODIUM LACTATE, POTASSIUM CHLORIDE, AND CALCIUM CHLORIDE SCH MLS: .6; .31; .03; .02 INJECTION, SOLUTION INTRAVENOUS at 09:26

## 2020-04-19 RX ADMIN — SODIUM CHLORIDE, SODIUM LACTATE, POTASSIUM CHLORIDE, AND CALCIUM CHLORIDE SCH MLS: .6; .31; .03; .02 INJECTION, SOLUTION INTRAVENOUS at 03:20

## 2020-04-19 RX ADMIN — DIGOXIN SCH MG: 0.25 INJECTION INTRAMUSCULAR; INTRAVENOUS at 21:51

## 2020-04-19 RX ADMIN — SODIUM CHLORIDE SCH: 0.9 INJECTION, SOLUTION INTRAVENOUS at 12:00

## 2020-04-19 RX ADMIN — Medication SCH ML: at 20:35

## 2020-04-19 RX ADMIN — METOPROLOL TARTRATE SCH: 25 TABLET ORAL at 20:00

## 2020-04-19 RX ADMIN — SODIUM CHLORIDE ONE MLS: 3 INJECTION, SOLUTION INTRAVENOUS at 11:20

## 2020-04-19 RX ADMIN — METOPROLOL TARTRATE SCH MG: 25 TABLET ORAL at 11:21

## 2020-04-19 RX ADMIN — VANCOMYCIN HYDROCHLORIDE SCH: 250 CAPSULE ORAL at 12:00

## 2020-04-19 RX ADMIN — VANCOMYCIN HYDROCHLORIDE SCH MG: 250 CAPSULE ORAL at 09:25

## 2020-04-19 RX ADMIN — VANCOMYCIN HYDROCHLORIDE SCH MG: 250 CAPSULE ORAL at 20:35

## 2020-04-19 RX ADMIN — HYDROCORTISONE SODIUM SUCCINATE SCH MG: 100 INJECTION, POWDER, FOR SOLUTION INTRAMUSCULAR; INTRAVENOUS at 11:20

## 2020-04-19 RX ADMIN — SODIUM CHLORIDE SCH MLS: 0.9 INJECTION, SOLUTION INTRAVENOUS at 18:07

## 2020-04-19 RX ADMIN — METRONIDAZOLE SCH MLS: 500 INJECTION, SOLUTION INTRAVENOUS at 09:25

## 2020-04-19 RX ADMIN — VANCOMYCIN HYDROCHLORIDE SCH MG: 250 CAPSULE ORAL at 15:47

## 2020-04-19 RX ADMIN — HYDROCORTISONE SODIUM SUCCINATE SCH MG: 100 INJECTION, POWDER, FOR SOLUTION INTRAMUSCULAR; INTRAVENOUS at 18:08

## 2020-04-19 RX ADMIN — ONDANSETRON PRN MG: 2 INJECTION INTRAMUSCULAR; INTRAVENOUS at 06:26

## 2020-04-19 RX ADMIN — SODIUM CHLORIDE ONE: 3 INJECTION, SOLUTION INTRAVENOUS at 06:30

## 2020-04-19 RX ADMIN — HEPARIN SODIUM SCH UNIT: 5000 INJECTION, SOLUTION INTRAVENOUS; SUBCUTANEOUS at 11:24

## 2020-04-19 RX ADMIN — FAMOTIDINE SCH MG: 10 INJECTION, SOLUTION INTRAVENOUS at 09:25

## 2020-04-19 RX ADMIN — SODIUM CHLORIDE SCH MLS: 0.9 INJECTION, SOLUTION INTRAVENOUS at 11:28

## 2020-04-19 RX ADMIN — Medication SCH ML: at 09:26

## 2020-04-19 RX ADMIN — METRONIDAZOLE SCH MLS: 500 INJECTION, SOLUTION INTRAVENOUS at 00:06

## 2020-04-19 NOTE — EKG
Test Date:    2020-04-17               Test Time:    19:28:06

Technician:   YUE                                    

                                                     

MEASUREMENT RESULTS:                                       

Intervals:                                           

Rate:         134                                    

WV:           174                                    

QRSD:         78                                     

QT:           276                                    

QTc:          412                                    

Axis:                                                

P:            61                                     

WV:           174                                    

QRS:          -19                                    

T:            76                                     

                                                     

INTERPRETIVE STATEMENTS:                                       

                                                     

Sinus tachycardia with occasional premature ventricular complexes

ST & T wave abnormality, consider lateral ischemia

Abnormal ECG

Compared to ECG 03/15/2020 07:31:07

Ventricular premature complex(es) now present

ST (T wave) deviation now present

Possible ischemia now present



Electronically Signed On 04-19-20 07:39:16 CDT by Jamison Myers

## 2020-04-19 NOTE — RAD REPORT
EXAM DESCRIPTION:  RAD - Chest Single View - 4/19/2020 9:20 am

 

CLINICAL HISTORY:  central line placement

 

COMPARISON:  April 17

 

TECHNIQUE:  AP portable chest image was obtained 4/19/2020 9:20 am .

 

FINDINGS:  Right jugular central line has been placed. Catheter tip is in the mid to distal SVC.

 

Low lung volumes present. No pneumothorax. No new or progressive lung parenchymal finding. Heart and 
vasculature are normal.

 

IMPRESSION:  Right jugular central line placement in good position.

 

No pneumothorax.

## 2020-04-19 NOTE — PN
Date of Progress Note:  04/19/2020



Subjective:  Patient was seen this morning for followup.  He was moved to ICU early this morning.  Ye
sterday, we gave him multiple IV fluid boluses.  During early morning hours, he was sent to ICU [facility] blood pressure was running on low side.  He continues to remain tachycardic with heart rate 120 to
 130, sinus tachycardia and ICU nurse reported 4 beats run of V-tach early this morning and short run
 of paroxysmal atrial fibrillation.  Prior to my arrival in ICU, we did place a Love catheter and he
 has approximately 100 to 120 mL of urine output since the catheter was placed and his core temperatu
re is 99.1 degrees Fahrenheit, so warming blankets that he had earlier after his arrival to ICU [facility] temperature was 96.2.  Now, the warming blanket was discontinued.  When I saw him in ICU, he actua
lly appeared better than yesterday.  He appeared very pale this morning.  He still appears pale, but 
his skin color is better than yesterday.  He is not in any distress.  His abdomen appears distended c
ompared to yesterday and denies any abdominal pain, but has some vague discomfort in abdomen as he de
scribed, which has remained unchanged since yesterday.  He had 2 small bowel movement early this morn
ing.  No vomiting, but has some intermittent nausea, but not when I saw him.  Prior to my arrival, we
 also requested Dr. Sevilla to place a central line which was placed in the right IJ area.



Physical Examination:

HEENT:  Unremarkable. 

Lungs:  Clear to auscultation. 

Heart:  Sounds normal. 

Abdomen:  Distended.  Bowel sounds hypoactive.  No guarding, rigidity.  Minimum awake, mild tendernes
s present, but no evidence of rebound tenderness. 

Extremities:  No leg edema.



Laboratory Data:  White count has gone up to 40.6, hemoglobin 14.3, platelets 398.  Sodium 139, potas
sium 4.2, chloride 110, bicarb 15, BUN 30, creatinine 3.63, glucose 174, lactic acid 3.2.  Magnesium 
level 2.1.



Impression:  

1.Clostridium difficile colitis.

2.Ileus secondary to above.

3.Acute kidney failure.

4.Metabolic acidosis.



Plan:  We will go ahead and continue DVT prophylaxis using heparin per order.  Continue IV Flagyl, or
al vancomycin, and vancomycin.  Rectal enema was ordered, which will be started this morning.  Contin
ue aggressive IV fluid resuscitation.  Patient does not need any vasopressor medication at this time,
 but we will consider that if it becomes necessary.  When I saw him in ICU, his systolic blood pressu
re was anywhere between 130 to 150 range.  We will continue to monitor his blood pressure along with 
pulse rate.  If blood pressure continues to remain stable like this and he continues to remain tachyc
ardic, then we will go ahead and start him on metoprolol.  His stool C diff test results came back po
sitive yesterday.  Patient does not have any family member and he has a friend by the name Faizansilvestre Mary cartwright listed as next of kin and with patient's permission, I did call him and discuss all the details w
ith him.





MARY/MODL

DD:  04/19/2020 10:21:25Voice ID:  [id number]

DT:  04/19/2020 11:58:06Report ID:  [id number]

## 2020-04-19 NOTE — CON
Date of Consultation:  04/19/2020



Reason For Consultation:  C diff colitis and patient needs a central line.



History Of Present Illness:  Patient is a 69-year-old gentleman, who has been having diarrhea for the
 about 5 or 6 days and felt weak, nausea, vomiting, and increasing diarrhea and therefore he came to 
the emergency room and was admitted and was consulted.  Patient had a cellulitis and lymphedema of [facility] lower extremity, was being treated for vancomycin about a month ago.  I had seen him for that in [facility].  He had been discharged with a PICC line on IV antibiotics and his cellulitis has improve
d and PICC line was removed last week.  He is awake, alert, really not much abdominal pain at this ti
me.  He had some nausea but he has not vomited.  He is still having some watery diarrhea and he had a
 C diff test which was positive and COVID was negative.  He has no sore throat, runny nose, cough, he
adaches, or dizziness.  No chest pain and low-grade fevers.  He was on the floor.  He had tachycardia
 and hypotension and he was transferred to the floor.  He only had 1 IV access and I was asked to als
o put in a central line for IV access purposes.



Review of Systems:

Otherwise unremarkable.



Past Medical History:  Significant for hypertension, morbid obesity, hyperlipidemia, prostate cancer,
 lymphedema, recent admission for cellulitis due to MRSA.



Past Surgical History:  Thyroidectomy.



Allergies:  NO ALLERGIES.



Social History:  He does not smoke.  Drinks alcohol occasionally.



Family History:  Noncontributory.



Physical Examination:

Vital Signs:  Currently, he is afebrile, he was actually hypothermic earlier prior to being transferr
ed.  Currently, his heart rate is 134, respiratory rate is 17, and his blood pressure is 84/57.  He i
s getting a fluid bolus.  His urine output is marginal, 150 cc over the last 12 hours. 

General:  He is awake, alert, and oriented x3. 

Head and Neck:  Cranial nerves 2 through 12 grossly within normal limits.  No neck masses.  No JVD.  
Throat clear.  Neck is supple. 

Chest:  Clear. 

Heart:  S1 and S2. 

Abdomen:  Soft, slightly distended.  Minimal right-sided tenderness.  No rebound, rigidity, or guardi
ng.  Hypoactive bowel sounds. 

Extremities:  Adequately perfused.  Nontender. 

Neuro:  Nonfocal.



Laboratory Data:  White count on admission yesterday evening was 18.6, this morning was 22.5 at 5 a.m
., the repeat was 40.6.  There is a left shift.  H and H were 11.8 and 35 on admission, currently 14.
3 and 43.3.  INR is 1.30.  Blood gas reviewed.  Chemistry shows lactic acid of 3.2, BUN is 30, creati
nine is 3.63.  Lactic acid was 2.3 on admission.  Procalcitonin was 0.5.  CT of the abdomen and pelvi
s reviewed, moderate to significant colitis that is seen, mild free fluid in the upper abdomen is pre
sent.  This area is cecum and ascending colon mostly, and the free fluid is in the hepatic and spleni
c edges.  There is edematous appearance of the central abdominal mesenteric fat.  Stool culture most 
of is pending, but I was told he is positive for C diff.



Assessment:  Acute sigmoid diverticulitis and patient with sepsis, dehydration.



Recommendations:  Continue antibiotics.  Patient is currently on oral Flagyl and vancomycin.  He is a
lso getting steroids, may be the reason why his white count jumped up so quickly as well as sepsis.  
We will go ahead and put a central line in the patient.  He needs more fluids.  We will give him 500 
cc of saline bolus.  The risks, benefits, and alternatives of the central line were explained to the 
patient, he agreed.



Procedure Note:  Under sterile condition, lidocaine 1% was infiltrated in the right IJ region.  18-ga
uge needle was used to access the right IJ vein.  Guidewire was passed.  Vein dilated.  Seldinger beena
hnique was used.  Tip of the catheter was placed into the SVC approximately 17 cm from the skin edge 
and then flushed with heparin and packed with heparin with good blood flow.  Secured with 3-0 silk.  
Sterile 

dressing was applied.  Patient tolerated the procedure in stable condition and chest x-ray has been o
rdered.





AS/MODL

DD:  04/19/2020 08:52:26Voice ID:  [id number]

DT:  04/19/2020 12:44:21Report ID:  [id number]

## 2020-04-19 NOTE — EKG
Test Date:    2020-04-17               Test Time:    21:47:08

Technician:   YUE                                    

                                                     

MEASUREMENT RESULTS:                                       

Intervals:                                           

Rate:         140                                    

PA:           140                                    

QRSD:         80                                     

QT:           272                                    

QTc:          415                                    

Axis:                                                

P:            6                                      

PA:           140                                    

QRS:          -9                                     

T:            94                                     

                                                     

INTERPRETIVE STATEMENTS:                                       

                                                     

Sinus tachycardia

Possible Left atrial enlargement

ST & T wave abnormality, consider lateral ischemia

Abnormal ECG

Compared to ECG 03/15/2020 07:31:07

ST (T wave) deviation now present

Possible ischemia now present



Electronically Signed On 04-19-20 07:39:07 CDT by Jamison Myers

## 2020-04-19 NOTE — RAD REPORT
EXAM DESCRIPTION:  RAD - Abdomen 1 View (KUB) - 4/19/2020 8:18 am

 

CLINICAL HISTORY:  abdominal distention

 

COMPARISON:  Abdomen   Pelvis Wo Contrast dated 4/18/2020

 

FINDINGS:  Exam is considered very limited due to the very large body habitus, respiratory motion and
 portable technique utilized.

 

No free air or pneumatosis identifiable. Prominent bowel loops in the left mid abdomen are present ap
pearing new from comparison. These are suspected to be small bowel loops that are dilating. Right-opal
ed colitis changes are evident. This matches the CT. No suspicious calcifications.

 

 

IMPRESSION:  Very limited portable KUB imaging shows new distended and dilated bowel loops in the lef
t mid abdomen.

 

The prominent left mid abdomen bowel loops are a change from prior study. Developing small bowel dila
tation is suspected.

## 2020-04-20 LAB
ALBUMIN SERPL BCP-MCNC: 1.1 G/DL (ref 3.4–5)
ALP SERPL-CCNC: 131 U/L (ref 45–117)
ALT SERPL W P-5'-P-CCNC: 102 U/L (ref 12–78)
ANISOCYTOSIS BLD QL: (no result)
AST SERPL W P-5'-P-CCNC: 273 U/L (ref 15–37)
BUN BLD-MCNC: 43 MG/DL (ref 7–18)
BUN BLD-MCNC: 44 MG/DL (ref 7–18)
BUN BLD-MCNC: 45 MG/DL (ref 7–18)
BUN BLD-MCNC: 46 MG/DL (ref 7–18)
BURR CELLS BLD QL SMEAR: (no result)
COHGB MFR BLDA: 0 % (ref 0–1.5)
COHGB MFR BLDA: 0 % (ref 0–1.5)
COHGB MFR BLDA: 0.6 % (ref 0–1.5)
GLUCOSE SERPLBLD-MCNC: 163 MG/DL (ref 74–106)
GLUCOSE SERPLBLD-MCNC: 170 MG/DL (ref 74–106)
GLUCOSE SERPLBLD-MCNC: 174 MG/DL (ref 74–106)
GLUCOSE SERPLBLD-MCNC: 195 MG/DL (ref 74–106)
HCT VFR BLD CALC: 41.3 % (ref 39.6–49)
HCT VFR BLD CALC: 42.4 % (ref 39.6–49)
LYMPHOCYTES # SPEC AUTO: 2 K/UL (ref 0.7–4.9)
LYMPHOCYTES # SPEC AUTO: 6.1 K/UL (ref 0.7–4.9)
MAGNESIUM SERPL-MCNC: 2.1 MG/DL (ref 1.8–2.4)
MAGNESIUM SERPL-MCNC: 2.5 MG/DL (ref 1.8–2.4)
MORPHOLOGY BLD-IMP: (no result)
MORPHOLOGY BLD-IMP: (no result)
OXYHGB MFR BLDA: 88.9 % (ref 94–97)
OXYHGB MFR BLDA: 93 % (ref 94–97)
OXYHGB MFR BLDA: 93.8 % (ref 94–97)
PMV BLD: 9 FL (ref 7.6–11.3)
PMV BLD: 9.1 FL (ref 7.6–11.3)
POIKILOCYTOSIS BLD QL SMEAR: (no result)
POLYCHROMASIA BLD QL SMEAR: (no result)
POTASSIUM SERPL-SCNC: 4 MMOL/L (ref 3.5–5.1)
POTASSIUM SERPL-SCNC: 4.2 MMOL/L (ref 3.5–5.1)
POTASSIUM SERPL-SCNC: 4.3 MMOL/L (ref 3.5–5.1)
POTASSIUM SERPL-SCNC: 4.7 MMOL/L (ref 3.5–5.1)
RBC # BLD: 4.27 M/UL (ref 4.33–5.43)
RBC # BLD: 4.39 M/UL (ref 4.33–5.43)
SAO2 % BLDA: 89.7 % (ref 92–98.5)
SAO2 % BLDA: 94.1 % (ref 92–98.5)
SAO2 % BLDA: 95.2 % (ref 92–98.5)
TOXIC GRANULES BLD QL SMEAR: (no result)
TOXIC GRANULES BLD QL SMEAR: (no result)

## 2020-04-20 RX ADMIN — FENTANYL CITRATE PRN MCG: 50 INJECTION, SOLUTION INTRAMUSCULAR; INTRAVENOUS at 22:07

## 2020-04-20 RX ADMIN — SODIUM CHLORIDE SCH: 0.45 INJECTION, SOLUTION INTRAVENOUS at 22:03

## 2020-04-20 RX ADMIN — SODIUM CHLORIDE PRN MLS: 0.9 INJECTION, SOLUTION INTRAVENOUS at 16:59

## 2020-04-20 RX ADMIN — METOPROLOL TARTRATE SCH: 25 TABLET ORAL at 12:00

## 2020-04-20 RX ADMIN — Medication SCH ML: at 20:03

## 2020-04-20 RX ADMIN — FAMOTIDINE SCH MG: 10 INJECTION, SOLUTION INTRAVENOUS at 08:30

## 2020-04-20 RX ADMIN — SODIUM CHLORIDE PRN MLS: 0.9 INJECTION, SOLUTION INTRAVENOUS at 11:00

## 2020-04-20 RX ADMIN — HYDROCORTISONE SODIUM SUCCINATE SCH MG: 100 INJECTION, POWDER, FOR SOLUTION INTRAMUSCULAR; INTRAVENOUS at 17:01

## 2020-04-20 RX ADMIN — SODIUM CHLORIDE SCH MLS: 0.9 INJECTION, SOLUTION INTRAVENOUS at 12:55

## 2020-04-20 RX ADMIN — SODIUM CHLORIDE PRN MLS: 0.9 INJECTION, SOLUTION INTRAVENOUS at 06:25

## 2020-04-20 RX ADMIN — METRONIDAZOLE SCH MLS: 500 INJECTION, SOLUTION INTRAVENOUS at 16:59

## 2020-04-20 RX ADMIN — HALOPERIDOL LACTATE PRN MG: 5 INJECTION, SOLUTION INTRAMUSCULAR at 22:54

## 2020-04-20 RX ADMIN — SODIUM CHLORIDE SCH: 0.9 INJECTION, SOLUTION INTRAVENOUS at 04:00

## 2020-04-20 RX ADMIN — VANCOMYCIN HYDROCHLORIDE SCH MG: 250 CAPSULE ORAL at 15:28

## 2020-04-20 RX ADMIN — HEPARIN SODIUM SCH UNIT: 5000 INJECTION, SOLUTION INTRAVENOUS; SUBCUTANEOUS at 08:31

## 2020-04-20 RX ADMIN — SODIUM CHLORIDE PRN MLS: 0.9 INJECTION, SOLUTION INTRAVENOUS at 14:19

## 2020-04-20 RX ADMIN — HYDROCORTISONE SODIUM SUCCINATE SCH MG: 100 INJECTION, POWDER, FOR SOLUTION INTRAMUSCULAR; INTRAVENOUS at 05:56

## 2020-04-20 RX ADMIN — HYDROCORTISONE SODIUM SUCCINATE SCH MG: 100 INJECTION, POWDER, FOR SOLUTION INTRAMUSCULAR; INTRAVENOUS at 23:14

## 2020-04-20 RX ADMIN — SODIUM CHLORIDE SCH MLS: 0.9 INJECTION, SOLUTION INTRAVENOUS at 05:53

## 2020-04-20 RX ADMIN — METRONIDAZOLE SCH MLS: 500 INJECTION, SOLUTION INTRAVENOUS at 00:48

## 2020-04-20 RX ADMIN — METOPROLOL TARTRATE SCH: 25 TABLET ORAL at 04:00

## 2020-04-20 RX ADMIN — VANCOMYCIN HYDROCHLORIDE SCH MG: 250 CAPSULE ORAL at 20:03

## 2020-04-20 RX ADMIN — SODIUM CHLORIDE SCH MLS: 0.9 INJECTION, SOLUTION INTRAVENOUS at 23:19

## 2020-04-20 RX ADMIN — METRONIDAZOLE SCH MLS: 500 INJECTION, SOLUTION INTRAVENOUS at 08:30

## 2020-04-20 RX ADMIN — VANCOMYCIN HYDROCHLORIDE SCH MG: 250 CAPSULE ORAL at 10:04

## 2020-04-20 RX ADMIN — SODIUM CHLORIDE SCH MLS: 0.9 INJECTION, SOLUTION INTRAVENOUS at 18:45

## 2020-04-20 RX ADMIN — SODIUM CHLORIDE PRN MLS: 0.9 INJECTION, SOLUTION INTRAVENOUS at 22:31

## 2020-04-20 RX ADMIN — HYDROCORTISONE SODIUM SUCCINATE SCH MG: 100 INJECTION, POWDER, FOR SOLUTION INTRAMUSCULAR; INTRAVENOUS at 12:52

## 2020-04-20 RX ADMIN — VANCOMYCIN HYDROCHLORIDE SCH MG: 250 CAPSULE ORAL at 02:56

## 2020-04-20 RX ADMIN — DIGOXIN SCH MG: 0.25 INJECTION INTRAMUSCULAR; INTRAVENOUS at 02:56

## 2020-04-20 RX ADMIN — HEPARIN SODIUM SCH UNIT: 5000 INJECTION, SOLUTION INTRAVENOUS; SUBCUTANEOUS at 20:02

## 2020-04-20 RX ADMIN — Medication SCH ML: at 08:32

## 2020-04-20 RX ADMIN — METOPROLOL TARTRATE SCH: 25 TABLET ORAL at 20:00

## 2020-04-20 NOTE — RAD REPORT
EXAM DESCRIPTION:  RAD - Abdomen Single View - 4/20/2020 6:58 am

 

CLINICAL HISTORY:  c diff colitis

 

COMPARISON:  Abdomen   Pelvis Wo Contrast dated 4/18/2020

 

FINDINGS:  Exam is extremely limited by large body habitus, portable technique and motion.

 

No free air or pneumatosis identified. Prominent loops of bowel are present believed to be colon. No 
convincing evidence for small bowel dilatation. Love catheter is in place. No suspicious calcificati
ons.

 

No significant bony findings

 

IMPRESSION:  Prominent colon loops without small bowel dilatation seen.

 

No free air or pneumatosis identifiable.

 

Exam should be viewed as extremely limited.

## 2020-04-20 NOTE — RAD REPORT
EXAM DESCRIPTION:  Rosa Single View4/20/2020 8:55 pm

 

CLINICAL HISTORY:  Device placement endotracheal tube placement

 

IMPRESSION:  An endotracheal tube has been inserted with its tip 4 centimeters above the tony.

 

A nasogastric tube has been placed into the stomach

## 2020-04-20 NOTE — RAD REPORT
EXAM DESCRIPTION:  RAD - Chest Single View - 4/20/2020 6:59 am

 

CLINICAL HISTORY:  dyspnea

 

COMPARISON:  April 19

 

TECHNIQUE:  AP portable chest image was obtained 4/20/2020 6:59 am .

 

FINDINGS:  Lung volumes are low. Shallow inspiration, large body habitus and portable technique limit
 the examination. No focal mass or consolidation seen. Medial left base opacification is probably ate
lectasis. Left costophrenic angle blunting is probably body habitus and portable technique affects ra
ther than any significant pleural effusion.

 

 Heart size normal range for shallow inspiration portable imaging. No abnormal vascular engorgement. 
No pneumothorax.

 

Right jugular central line is in place. Tip is obscured by motion. Tubing overlies the midline chest.
 This is poorly visualized due to exam limitations. It is uncertain if this is an ET tube or NG tube.


 

IMPRESSION:  Shallow inspiration film showing medial left lung base opacification favored to be atele
ctasis.

 

No significant failure or volume overload findings.

 

Right jugular line remains in place. Tubing in the midline chest is believed to be an NG tube but is 
poorly visualized by exam limitations.

## 2020-04-20 NOTE — P.CNS
Date of Consult: 04/20/20


Reason for Consult: Shock


Chief Complaint: Nausea vomiting


History of Present Illness: 





Patient is 69 years of age admitted with nausea vomiting C difficile diarrhea 

currently in shock is on vasopressors patient has acute on chronic renal failure

white count is significantly elevated has some abdominal discomfort currently on

vasopressors denies any pulmonary complain


Allergies





No Known Allergies Allergy (Verified 04/18/20 00:50)


   





Home Medications: 








Abiraterone Acetate [Zytiga] 1,000 mg PO DAILY 03/11/20 


Amlodipine Besylate 5 mg PO BID 03/11/20 


Ascorbate Calcium [Vitamin C] 1 tab PO DAILY 03/11/20 


Aspirin [Aspirin EC 81 MG] 1 tab PO BEDTIME 03/11/20 


Clonidine HCl [Catapres] 0.3 mg PO TID 03/11/20 


Hydralazine [Apresoline*] 50 mg PO BID 03/11/20 


Lisinopril [Zestril] 1 tab PO DAILY 03/11/20 


Metoprolol Tartrate 100 mg PO BID 03/11/20 


Montelukast Sodium 10 mg PO PRN 03/11/20 


Tamsulosin HCl 1 tab PO DAILY 03/11/20 


Ubidecarenone [Co Q10] 200 mg PO DAILY 03/11/20 


hydroCHLOROthiazide [Hydrochlorothiazide] 12.5 mg PO DAILY 03/11/20 


predniSONE [Prednisone*] 5 mg PO BID 03/11/20 


Cholecalciferol (Vitamin D3) [Vitamin D3] 1,000 unit PO DAILY 04/18/20 


Cholecalciferol (Vitamin D3) [Vitamin D3] 5,000 unit PO DAILY 04/18/20 


Ferrous Sulfate [Ferrous Sulfate*] 1 tab PO DAILY 04/18/20 








- Past Medical/Surgical History


Diabetic: No


-: Hypertension


-: Prostate CA, Radiation 45 days last Dec 18,2019


-: thyroidectomy





- Family History


  ** Father


Medical History: Heart disease





  ** Mother


Medical History: Other (see notes)


Notes: parkinson





  ** Brother


Medical History: Other (see notes)


Notes: sepsis





- Social History


Alcohol use: No


CD- Drugs: No


Caffeine use: Yes


Place of Residence: Home





Review of Systems


General: Weakness


Respiratory: Shortness of Breath


Gastrointestinal: Abdominal Pain, Diarrhea





Physical Examination














Temp Pulse Resp BP Pulse Ox


 


 98.1 F   133 H  27 H  94/44 L  96 


 


 04/20/20 08:00  04/20/20 11:15  04/20/20 11:15  04/20/20 11:15  04/20/20 11:00








General: Alert, In no apparent distress, Oriented x3


HEENT: Atraumatic


Neck: Supple


Respiratory: Clear to auscultation bilaterally


Cardiovascular: No edema, Edema


Gastrointestinal: Normal bowel sounds, Masses (Very mild tenderness)





- Problems


(1) Septic shock due to Clostridioides difficile


Current Visit: Yes   Status: Acute   


Plan: 


Patient is 69 years of age admitted with septic shock CT scan shows colitis C 

difficile positive with vancomycin Flagyl seen by Nephrology in you with 

vasopressors patient has metabolic acidosis and hyporcapnic is been significant 

worsening of his renal function and increasing white count so far is oxygenation

is satisfactory is very alert oriented responsive cooperate

## 2020-04-20 NOTE — RAD REPORT
EXAM DESCRIPTION:  US - Renal Ultrasound-Complete - 4/20/2020 1:04 pm

 

CLINICAL HISTORY:  Acute renal insufficiency

 

COMPARISON:  April 18, 2020 cat scan 11

 

FINDINGS:  The right kidney measures 11 cm with an increased echotexture.

 

The left kidney measures 11 cm with an increased echotexture. Left renal cyst seen on the CT scan is 
not clearly visualized on the current exam.

 

Hydronephrosis is not seen. No gross abnormality of bladder is seen

 

IMPRESSION:  Increased renal echotexture consistent with parenchymal disease

 

No hydronephrosis

## 2020-04-20 NOTE — PN
Date of Progress Note:  04/20/2020



Subjective:  Patient is awake, alert.  Feels better than the last night; however, he is complaining o
f some abdominal discomfort.



Objective:  Vital Signs:  Significant for tachycardia and hypotension.  He is afebrile.  Respiratory 
rate is between 20 and 30. 

Abdomen: Distended, soft. There is very minimal tenderness. There is no rebound, rigidity, or guardin
g. 

Extremities:  His NG tube has put out 700 cc since yesterday evening



Laboratory Data:  Reviewed.  It shows white count of 65.6 with a left shift.  His chemistry shows lac
tic acid, which was up to 5.8, at 4:51 this morning, is down to 4.9 at 8:10 this morning.  His BUN an
d creatinine are up from yesterday to 43 and 4.64 and his CO2 is 15. Procalcitonin is 7.98, which is 
down from 8.47.



Assessment:  Clostridium difficile colitis, sepsis.



Recommendation:  Resuscitation ongoing with antibiotics.  Blood pressure, fluid boluses.  Nephrology 
has been consulted.  We will await their recommendation.  The patient is in critical condition; howev
er, the numbers do look worse than the clinical picture of the patient.  We will follow this patient 
closely.





AS/MODL

DD:  04/20/2020 11:10:08Voice ID:  [id number]

DT:  04/20/2020 11:36:26Report ID:  [id number]

## 2020-04-21 LAB
ALBUMIN SERPL BCP-MCNC: 1 G/DL (ref 3.4–5)
ALBUMIN SERPL BCP-MCNC: 2.1 G/DL (ref 3.4–5)
ALP SERPL-CCNC: 206 U/L (ref 45–117)
ALP SERPL-CCNC: 209 U/L (ref 45–117)
ALT SERPL W P-5'-P-CCNC: 342 U/L (ref 12–78)
ALT SERPL W P-5'-P-CCNC: 532 U/L (ref 12–78)
AST SERPL W P-5'-P-CCNC: 1257 U/L (ref 15–37)
AST SERPL W P-5'-P-CCNC: 2069 U/L (ref 15–37)
BUN BLD-MCNC: 46 MG/DL (ref 7–18)
BUN BLD-MCNC: 46 MG/DL (ref 7–18)
COHGB MFR BLDA: 0.5 % (ref 0–1.5)
GLUCOSE SERPLBLD-MCNC: 136 MG/DL (ref 74–106)
GLUCOSE SERPLBLD-MCNC: 159 MG/DL (ref 74–106)
HCT VFR BLD CALC: 34.8 % (ref 39.6–49)
INR BLD: 2.87
INR BLD: 5.23
INR BLD: 5.88
LYMPHOCYTES # SPEC AUTO: 4.8 K/UL (ref 0.7–4.9)
MAGNESIUM SERPL-MCNC: 2.1 MG/DL (ref 1.8–2.4)
OXYHGB MFR BLDA: 95.8 % (ref 94–97)
PMV BLD: 8.5 FL (ref 7.6–11.3)
POTASSIUM SERPL-SCNC: 4.2 MMOL/L (ref 3.5–5.1)
POTASSIUM SERPL-SCNC: 4.3 MMOL/L (ref 3.5–5.1)
RBC # BLD: 3.64 M/UL (ref 4.33–5.43)
SAO2 % BLDA: 97.4 % (ref 92–98.5)

## 2020-04-21 PROCEDURE — 06HM33Z INSERTION OF INFUSION DEVICE INTO RIGHT FEMORAL VEIN, PERCUTANEOUS APPROACH: ICD-10-PCS

## 2020-04-21 RX ADMIN — FENTANYL CITRATE PRN MCG: 50 INJECTION, SOLUTION INTRAMUSCULAR; INTRAVENOUS at 04:09

## 2020-04-21 RX ADMIN — HEPARIN SODIUM SCH: 5000 INJECTION, SOLUTION INTRAVENOUS; SUBCUTANEOUS at 09:00

## 2020-04-21 RX ADMIN — SODIUM CHLORIDE SCH MLS: 0.45 INJECTION, SOLUTION INTRAVENOUS at 16:14

## 2020-04-21 RX ADMIN — HYDROCORTISONE SODIUM SUCCINATE SCH: 100 INJECTION, POWDER, FOR SOLUTION INTRAMUSCULAR; INTRAVENOUS at 12:00

## 2020-04-21 RX ADMIN — HALOPERIDOL LACTATE PRN MG: 5 INJECTION, SOLUTION INTRAMUSCULAR at 05:00

## 2020-04-21 RX ADMIN — FENTANYL CITRATE PRN MCG: 50 INJECTION, SOLUTION INTRAMUSCULAR; INTRAVENOUS at 14:04

## 2020-04-21 RX ADMIN — SODIUM CHLORIDE SCH: 0.9 INJECTION, SOLUTION INTRAVENOUS at 06:00

## 2020-04-21 RX ADMIN — FENTANYL CITRATE PRN MCG: 50 INJECTION, SOLUTION INTRAMUSCULAR; INTRAVENOUS at 12:03

## 2020-04-21 RX ADMIN — VANCOMYCIN HYDROCHLORIDE SCH MG: 250 CAPSULE ORAL at 16:13

## 2020-04-21 RX ADMIN — METOPROLOL TARTRATE SCH: 25 TABLET ORAL at 04:00

## 2020-04-21 RX ADMIN — SODIUM CHLORIDE PRN MLS: 0.9 INJECTION, SOLUTION INTRAVENOUS at 07:33

## 2020-04-21 RX ADMIN — SODIUM CHLORIDE SCH MLS: 0.45 INJECTION, SOLUTION INTRAVENOUS at 00:05

## 2020-04-21 RX ADMIN — METRONIDAZOLE SCH MLS: 500 INJECTION, SOLUTION INTRAVENOUS at 00:04

## 2020-04-21 RX ADMIN — HYDROCORTISONE SODIUM SUCCINATE SCH MG: 100 INJECTION, POWDER, FOR SOLUTION INTRAMUSCULAR; INTRAVENOUS at 17:13

## 2020-04-21 RX ADMIN — PHYTONADIONE SCH MG: 10 INJECTION, EMULSION INTRAMUSCULAR; INTRAVENOUS; SUBCUTANEOUS at 07:57

## 2020-04-21 RX ADMIN — SODIUM CHLORIDE PRN MLS: 0.9 INJECTION, SOLUTION INTRAVENOUS at 12:05

## 2020-04-21 RX ADMIN — VANCOMYCIN HYDROCHLORIDE SCH MG: 250 CAPSULE ORAL at 21:47

## 2020-04-21 RX ADMIN — FENTANYL CITRATE PRN MCG: 50 INJECTION, SOLUTION INTRAMUSCULAR; INTRAVENOUS at 22:31

## 2020-04-21 RX ADMIN — SODIUM CHLORIDE SCH MLS: 0.45 INJECTION, SOLUTION INTRAVENOUS at 07:33

## 2020-04-21 RX ADMIN — METRONIDAZOLE SCH MLS: 500 INJECTION, SOLUTION INTRAVENOUS at 16:16

## 2020-04-21 RX ADMIN — VANCOMYCIN HYDROCHLORIDE SCH MG: 250 CAPSULE ORAL at 04:07

## 2020-04-21 RX ADMIN — Medication SCH ML: at 09:03

## 2020-04-21 RX ADMIN — SODIUM CHLORIDE PRN MLS: 0.9 INJECTION, SOLUTION INTRAVENOUS at 02:47

## 2020-04-21 RX ADMIN — SODIUM CHLORIDE SCH MLS: 0.9 INJECTION, SOLUTION INTRAVENOUS at 17:14

## 2020-04-21 RX ADMIN — Medication SCH ML: at 21:37

## 2020-04-21 RX ADMIN — METRONIDAZOLE SCH MLS: 500 INJECTION, SOLUTION INTRAVENOUS at 09:02

## 2020-04-21 RX ADMIN — SODIUM CHLORIDE SCH: 0.9 INJECTION, SOLUTION INTRAVENOUS at 09:00

## 2020-04-21 RX ADMIN — SODIUM CHLORIDE SCH MLS: 9 INJECTION, SOLUTION INTRAVENOUS at 21:36

## 2020-04-21 RX ADMIN — FENTANYL CITRATE PRN MCG: 50 INJECTION, SOLUTION INTRAMUSCULAR; INTRAVENOUS at 00:20

## 2020-04-21 RX ADMIN — FENTANYL CITRATE PRN MCG: 50 INJECTION, SOLUTION INTRAMUSCULAR; INTRAVENOUS at 11:15

## 2020-04-21 RX ADMIN — SODIUM CHLORIDE SCH MLS: 9 INJECTION, SOLUTION INTRAVENOUS at 09:01

## 2020-04-21 RX ADMIN — SODIUM CHLORIDE PRN MLS: 0.9 INJECTION, SOLUTION INTRAVENOUS at 16:13

## 2020-04-21 RX ADMIN — HYDROCORTISONE SODIUM SUCCINATE SCH MG: 100 INJECTION, POWDER, FOR SOLUTION INTRAMUSCULAR; INTRAVENOUS at 07:18

## 2020-04-21 RX ADMIN — VANCOMYCIN HYDROCHLORIDE SCH MG: 250 CAPSULE ORAL at 09:06

## 2020-04-21 RX ADMIN — HEPARIN SODIUM SCH: 5000 INJECTION, SOLUTION INTRAVENOUS; SUBCUTANEOUS at 21:00

## 2020-04-21 RX ADMIN — SODIUM CHLORIDE SCH MLS: 0.9 INJECTION, SOLUTION INTRAVENOUS at 12:06

## 2020-04-21 RX ADMIN — METOPROLOL TARTRATE SCH: 25 TABLET ORAL at 12:00

## 2020-04-21 NOTE — PN
Date of Progress Note:  04/20/2020



Subjective:  Patient was seen this morning for followup.  He was lying in bed in ICU on oxygen.  Abdo
men was the way he described was feeling uncomfortable, but unchanged from yesterday.  Some loose to 
liquid bowel movement during nighttime.



Objective:  Vital signs:  Reviewed.  Intake output records reviewed. 

HEENT:  Unremarkable. 

Lungs:  Clear to auscultation.  Not in respiratory distress. 

Cardiac:  Heart sounds normal. 

Abdomen:  Distended.  Bowel sounds hypoactive. 

Extremities:  No leg edema 

Skin:  Appears pale.



Laboratory Data:  This morning, white count was 65.6, hemoglobin 13.3, platelets 279.  Blood gas, pH 
7.35, pCO2 21.7, PO2 81.5, saturation 95% on 36% FiO2 and his chemistry this morning sodium 143, pota
ssium 4.3, chloride 112, bicarb 15, BUN 43, creatinine 4.64, glucose 163.  Repeat chemistry later thi
s morning, sodium 142, potassium 4.2, chloride 110, bicarb 16, BUN 44, creatinine 4.88, glucose 170. 
 Lactic acid level this morning was 5.8.  Last chemistry this evening around 4:30 p.m., BUN 46, creat
inine 5, bicarb 16, sodium 141, potassium 4.  His procalcitonin this morning was 7.98. 

Renal ultrasound done today was negative for any acute changes, increase renal echotexture consistent
 with parenchymal disease.  No hydronephrosis.  Chest x-ray shows shallow inspiration film showing me
dial left lung base opacification likely due to atelectasis.  No significant failure or volume overlo
ad finding and abdominal x-ray shows prominent colon loops with small bowel dilatation.  No free air.




Impression:  

1.Septic shock.

2.Acute kidney failure.

3.Clostridium difficile colitis.

4.Metabolic acidosis.

5.Acute respiratory failure.



Plan:  Throughout the course of day today, the patient received IV fluid, bicarb, antibiotics.  Nephr
ology and Pulmonary consultation were obtained.  Details were discussed with the nephrologist as well
 as pulmonologist.  Patient had very small amount of urine output today throughout the day today.  In
take and output were monitored carefully and IV fluid boluses were given along with maintenance IV fl
uid.  After talking to nephrologist, we started to plan for need for dialysis and considering his sig
nificant hypotension problem requiring vasopressor medication, which was started this morning and all
 throughout the day, we still continued to have problem with hypotension requiring titration of vasop
ressor medication.  Decision was made to go ahead and initiate the transfer process to [place] where 
our nephrologist can assist us with dialysis needs and I did talk to [facility] hospitalist this afternoon.  Patient was accepted, but they did not have any ICU bed available, 
so hopefully bed might be available tomorrow as I understand.  Meanwhile, just a while ago, nurse bin lewis ICU notified me that all of a sudden the patient became bradycardic and hypotensive.  His blood pre
ssure dropped down to systolic anywhere between 30-50 systolic and pulse rate went down to 50 and thi
s happened suddenly.  Code was called and the patient was intubated.  After intubation, his blood pre
ssure came up to around 110 systolic, heart rate is around 110 and his color looks better, as per my 
discussion with ICU nursing staff.  He remains on vasopressor medication IV fluid and we will go ahea
d and continue ventilator support and critical care management per Dr. Biggs and nurse was advised
 to contact Dr. Biggs to let him know about this change in condition.  I have called the patient's
 friend, Mr. Lizette Su 3 different times today to notify him with updates including just few edna
marie ago notified him of the patient's going on life support.  Overall, prognosis remains poor.  I nirmal
l see him tomorrow morning.





MARY/MODL

DD:  04/20/2020 21:03:20Voice ID:  [id number]

DT:  04/21/2020 00:22:27Report ID:  [id number]

## 2020-04-21 NOTE — P.PN
Subjective


Date of Service: 04/20/20


Chief Complaint: Respiratory failure shock





Patient is 69 years of age a my saw this afternoon he deteriorated became 

unresponsive intubated when I came to see man around 9:30 p.m. patient was on 

multiple vasopressors unresponsive 100% oxygen very tachypneic multiorgan 

failure very agitated





Review of Systems


is unable to be obtained





Physical Examination





- Vital Signs


Temperature: 98.5 F


Blood Pressure: 87/74


Pulse: 127


Respirations: 34


Pulse Ox (%): 100





- Physical Exam


General: Moderate distress


Respiratory: Clear to auscultation bilaterally, Diminished


Cardiovascular: Normal S1 S2, Edema


Gastrointestinal: Hypoactive, Distended


Musculoskeletal: No clubbing


Integumentary: No rashes





- Studies


Microbiology Data (last 24 hrs): 








04/17/20 21:00   Throat   Culture & Sensitivity - Final


                            NORMAL UPPER RESPIRATORY RAYMOND GROWN.


04/17/20 22:00   Stool   Culture & Sensitivity - Final








Assessment & Plan





- Problems (Diagnosis)


(1) Septic shock due to Clostridioides difficile


Current Visit: Yes   Status: Acute   


Plan: 


Patient is 69 years of age admitted with septic shock CT scan shows colitis C 

difficile positive with vancomycin Flagyl seen by Nephrology in you with 

vasopressors patient has metabolic acidosis and hyporcapnic is been significant 

worsening of his renal function and increasing white count so far is oxygenation

is satisfactory is very alert oriented responsive cooperate








(2) Shock


Current Visit: Yes   Status: Acute   


Plan: 


Patient is 69 years of age admitted with respiratory failure septic shock right 

metabolic acidosis worsening renal failure hepatic failure multiorgan failure 

white count is significantly elevated patient very acidotic continue with 

vasopressors patient change to assist-control chest x-ray reviewed.  ET tube 

satisfactory and later adjusted discussed with renal and Dr. Hansen he is to get 

some fluid boluses also got some albumin prognosis very poor discuss with the 

friend

## 2020-04-21 NOTE — OP
Surgeon:  Betito Sevilla MD



Indication:  I was contacted last night by the ICU nurse stating that Dr. Cueva wants to start pat
ient on dialysis.  Patient could not be transferred to [facility] because of bed availability.  The brenda
ent is on pressors however.  They were planning on doing ion exchange and so removing fluids.  Inform
ed consent could not be obtained from the patient, he is intubated, unable to understand the procedur
e.  This is an administrative consent as it was deemed necessary.  His laboratory data was reviewed. 
 His INR is over 5.



Description Of Procedure:  Knowing that we did not attempt the neck or the chest, right femoral regio
n would be the safest for complications.  Therefore, the right femoral region was prepped and draped 
in usual sterile fashion.  Lidocaine 1% was infiltrated locally.  An 18-gauge needle was used to acce
ss the right femoral vein.  Guidewire passed and then Seldinger technique used.  The vein dilated car
efully and catheter placed and secured with 2-0 nylon.  Catheter flushed with heparin and packed with
 heparin.  Sterile dressing applied.  Please note, 10 minutes of direct pressure was applied by me in
 the right groin to hopefully prevent hematomas and a sandbag has been used as well.  The patient was
 in critical condition throughout the procedure, hypertensive and tachycardic; however, his vitals di
d not change during the procedure.





AS/MODL

DD:  04/21/2020 06:17:54Voice ID:  [id number]

DT:  04/21/2020 07:45:21Report ID:  [id number]

## 2020-04-21 NOTE — EKG
Test Date:    2020-04-21               Test Time:    12:49:26

Technician:   GORDON                                    

                                                     

MEASUREMENT RESULTS:                                       

Intervals:                                           

Rate:         126                                    

CO:           156                                    

QRSD:         66                                     

QT:           324                                    

QTc:          469                                    

Axis:                                                

P:            62                                     

CO:           156                                    

QRS:          -27                                    

T:            97                                     

                                                     

INTERPRETIVE STATEMENTS:                                       

                                                     

Sinus tachycardia with occasional premature ventricular complexes

Low voltage QRS

Abnormal QRS-T angle, consider primary T wave abnormality

Abnormal ECG

Compared to ECG 04/17/2020 21:47:08

Ventricular premature complex(es) now present

Low QRS voltage now present

T-wave abnormality now present

ST (T wave) deviation no longer present

Possible ischemia no longer present



Electronically Signed On 04-21-20 16:12:56 CDT by Jamison Myers

## 2020-04-21 NOTE — CON
Date of Consultation:  04/20/2020



Chief Complaint:  Acute kidney injury, severe, borderline oliguric.



History Of Present Illness:  Patient is in ICU.  He has history of severe Clostridium difficile colit
is and diarrhea.  He was evaluated by Surgical team and Pulmonary team.  Urine output has been declin
ing over last 24 hours and nephrology consultation was requested today.  Patient is in septic shock. 
 He has been treated with multiple IV boluses normal saline and received IV bicarbonate replacement f
or metabolic acidosis.  He was found to have lactic acidosis.  Leukocyte count was accelerating over 
last 24 hours from 40,000 up to 65,800, hemoglobin level remains within a stable range.  Today, hemog
lobin is up to 13.5 on April 19 was 14.3.  Patient is receiving very aggressive IV fluids resuscitati
on and urine output has not improved significantly over last 12 hours urine output is 1800.  Over las
t 12 hours urine output was 150 mL and in addition to that is there was 120 mL.  Patient received IV 
fluids and he has positive balance.  CT scan of the abdomen was done and showed colitis.  The renal u
ltrasound did not show hydronephrosis.  ABG was done this morning and pH was within normal limits.  VIKTOR ramos is a 69-year-old man admitted with nausea, vomiting, and was found to have C diff diarrhea.  C
urrently, he remains on multiple pressors and he was intubated after he became unresponsive and was f
ound to have bradycardia.  He is on multiple vasopressors and he is receiving IV normal saline and an
 additional dose with sodium bicarbonate because ABG showed drop of pH.  Patient has multiple medical
 problems including hypertension, BPH, history of prostate cancer status post radiation and he comple
opal radiation 45 days ago on December 18, 2019.  He had thyroidectomy.



Family History:  Father had a heart disease.  Mother, Parkinson disease.  Brother, diabetes.



Past Surgical History:  None.



Social History:  Negative for tobacco, alcohol, or illicit drugs.



Physical Examination:

General:  Patient is intubated, on multiple pressors. 

Lungs:  Breath sound present bilaterally. 

Heart:  S1, S2. 

Abdomen:  Distended. 

Extremities:  Slight edema in both ankles. 

Neurologic:  Patient is intubated cannot be evaluated.  There is no seizure.  No tremor.



Laboratory Data:  This morning lab work shows sodium 142, potassium 4.2, chloride 110, CO2 16, BUN 44
, creatinine 4.88, glucose 170, calcium 7.8.  Lactic acid was 4.9.  ABG was, pH 7.35, pCO2 21.7, and 
PO2 81.5, SpO2 98.2.  Renal ultrasound showed right kidney 11 cm in length with increased echotexture
, left kidney 11 cm with increased echotexture.  Left renal cyst seen on CT scan, not clearly visuali
zed.  No hydronephrosis.



Impression And Plan:  Patient is critically ill and septic shock with a Clostridium difficile colitis
.  He was consulted by pulmonologist, Critical Care and Surgical team.  Nephrology consultation was r
equested for acute kidney injury and transferred to higher level of care was initiated this afternoon
 and so CVVHD.  Currently, the patient is after intubation and he remains on multiple pressors.  Paty
ent has Clostridium diff positive test and Coronavirus disease was negative.  He was seen by Surgical
 Team for severe colitis with Clostridium difficile colitis.  He is on multiple antibiotics of broad 
spectrum.  Septic shock and is receiving fluid resuscitation.  Patient currently is on Flagyl and p.o
. vancomycin for severe Clostridium difficile colitis.  Patient will continue bicarbonate drip for me
tabolic acidosis.  He may need to start CVVHD.  Patient remains critically ill.  Currently, he is not
 stable for conventional hemodialysis.  Plan is to continue IV fluids and bicarbonate drip.  Case nirmal
l be discussed with Critical Care.





EB/MODL

DD:  04/20/2020 21:57:20Voice ID:  [id number]

DT:  04/21/2020 01:27:35Report ID:  [id number]

## 2020-04-21 NOTE — P.PN
Subjective


Date of Service: 20


Chief Complaint: Respiratory failure shock





Subjective 


A 69-year-old with PMHx of HTN , prostate  was on radiation therapy until 

2019 and also was  Zytiga which was stopped and steroids 


Pt was recently admitted or sepsis and JACINTO 


this admission pt was admitted for abd pain, found to have severe C.Diff, course

complicated by cardiac arrest, required incubation 





today 


Pt on 2 pressers 


transfer on hold due to instability 


severe acidosis , cont bicarb drip 


no indication for renal replacement therapy, at this time 


if Cr cont to trend up with oliguria and sever acidosis, then will need to  

initiate HD with adding Vasopressin if needed though the risk of initiating HD 

outweigh the benefits


  





Allergies:  NO KNOWN ALLERGIES.





Medications:  Amlodipine 5 mg 2 times a day, aspirin 81 mg daily, clonidine 0.3 

mg 3 times a day, hydralazine 50 mg p.o. 2 times a day, hydrochlorothiazide 12.5

mg p.o. daily, lisinopril 20 mg p.o. 2 times a day, metoprolol tartrate 100 mg 

p.o. 2 times a day, tamsulosin 0.4 mg p.o. daily.





Review of Systems:


as in HPI 





Past Medical History:  


as in HPI 





Past Surgical History:  Partial thyroidectomy in , due to goiter and this 

was in form of removal of left thyroid lobe.





Family History:  Father , had coronary artery disease, hypertension.  Mother

, had Parkinson disease.





Social History:  Negative for smoking.  Occasional use of alcohol.

















 Physical exam 


general: intubated , obese, moving extrmities 


Neck; Supple, No elevated JVD 


hear: RRR, normal S1,2 no murmur or rub


Chest: CTAB, no rales or wheezes  


Abdomen: distended, BS +ve 


Extremities no edema , or erythema 

















A/p 








JACINTO on CKD  


US: no hydro 


Anuric 


severe acidosis , cont bicarb drip 


no indication for renal replacement therapy, at this time 


if Cr cont to trend up with oliguria and sever acidosis, then will need to  

initiate HD with adding Vasopressin if needed though the risk of initiating HD 

outweigh the benefits








Severe Sepsis 


Due to C.diff 


will recheck lactic acid 


on 2 pressers 


if bp drops , then will need to add 3 presser 








HAGMA 


severe acidosis , cont bicarb drip 


no indication for renal replacement therapy, at this time 


if Cr cont to trend up with oliguria and sever acidosis, then will need to  

initiate HD with adding Vasopressin if needed though the risk of initiating HD 

outweigh the benefits





elevated cardiac enzymes 


will trend 








Poor prognosis








Physical Examination





- Vital Signs


Temperature: 98.5 F


Blood Pressure: 87/74


Pulse: 127


Respirations: 34


Pulse Ox (%): 100

## 2020-04-21 NOTE — P.PN
Subjective


Date of Service: 04/21/20


Chief Complaint: Respiratory failure shock





No change still on 100% oxygen multiple vasopressors agitated





Review of Systems


is unable to be obtained





Physical Examination





- Vital Signs


Temperature: 98.5 F


Blood Pressure: 87/74


Pulse: 127


Respirations: 34


Pulse Ox (%): 100





- Physical Exam


General: Unresponsive


Respiratory: Clear to auscultation bilaterally, Diminished


Cardiovascular: Edema


Gastrointestinal: Hypoactive


Musculoskeletal: No clubbing, No swelling





- Studies


Microbiology Data (last 24 hrs): 








04/17/20 21:00   Throat   Culture & Sensitivity - Final


                            NORMAL UPPER RESPIRATORY R[place]MON[place] GROWN.


04/17/20 22:00   Stool   Culture & Sensitivity - Final








Assessment & Plan





- Problems (Diagnosis)


(1) Septic shock due to Clostridioides difficile


Current Visit: Yes   Status: Acute   


Plan: 


Patient is on antibiotic








(2) Shock


Current Visit: Yes   Status: Acute   


Plan: 


Patient's overall prognosis is very poor he is deteriorating maximum antibiotic 

therapy on 100% oxygen multiple vasopressors patient is currently not stable for

transfer unable to do dialysis due to his hypotension discuss with Nephrology 

patient has a metabolic acidosis very hypoxic coagulopathy elevated PT INR 

worsening renal and liver function

## 2020-04-22 VITALS — OXYGEN SATURATION: 99 %

## 2020-04-22 VITALS — TEMPERATURE: 97.4 F

## 2020-04-22 VITALS — DIASTOLIC BLOOD PRESSURE: 32 MMHG | SYSTOLIC BLOOD PRESSURE: 82 MMHG

## 2020-04-22 LAB
ANISOCYTOSIS BLD QL: (no result)
BURR CELLS BLD QL SMEAR: (no result)
GIANT PLATELETS BLD QL SMEAR: (no result)
HCT VFR BLD CALC: 32 % (ref 39.6–49)
INR BLD: 3.82
MORPHOLOGY BLD-IMP: (no result)
PMV BLD: 11.4 FL (ref 7.6–11.3)
RBC # BLD: 3.31 M/UL (ref 4.33–5.43)
TOXIC GRANULES BLD QL SMEAR: (no result)

## 2020-04-22 RX ADMIN — METRONIDAZOLE SCH MLS: 500 INJECTION, SOLUTION INTRAVENOUS at 09:07

## 2020-04-22 RX ADMIN — FENTANYL CITRATE PRN MCG: 50 INJECTION, SOLUTION INTRAMUSCULAR; INTRAVENOUS at 06:00

## 2020-04-22 RX ADMIN — SODIUM CHLORIDE SCH MLS: 0.9 INJECTION, SOLUTION INTRAVENOUS at 00:15

## 2020-04-22 RX ADMIN — HYDROCORTISONE SODIUM SUCCINATE SCH MG: 100 INJECTION, POWDER, FOR SOLUTION INTRAMUSCULAR; INTRAVENOUS at 00:15

## 2020-04-22 RX ADMIN — SODIUM CHLORIDE SCH MG: 0.9 INJECTION, SOLUTION INTRAVENOUS at 09:07

## 2020-04-22 RX ADMIN — VANCOMYCIN HYDROCHLORIDE SCH MG: 250 CAPSULE ORAL at 05:12

## 2020-04-22 RX ADMIN — Medication SCH ML: at 09:07

## 2020-04-22 RX ADMIN — METRONIDAZOLE SCH MLS: 500 INJECTION, SOLUTION INTRAVENOUS at 00:15

## 2020-04-22 RX ADMIN — SODIUM CHLORIDE SCH MLS: 0.45 INJECTION, SOLUTION INTRAVENOUS at 09:06

## 2020-04-22 RX ADMIN — FENTANYL CITRATE PRN MCG: 50 INJECTION, SOLUTION INTRAMUSCULAR; INTRAVENOUS at 01:23

## 2020-04-22 RX ADMIN — SODIUM CHLORIDE SCH MLS: 9 INJECTION, SOLUTION INTRAVENOUS at 09:06

## 2020-04-22 RX ADMIN — HEPARIN SODIUM SCH: 5000 INJECTION, SOLUTION INTRAVENOUS; SUBCUTANEOUS at 09:00

## 2020-04-22 RX ADMIN — SODIUM CHLORIDE SCH MLS: 0.9 INJECTION, SOLUTION INTRAVENOUS at 05:12

## 2020-04-22 RX ADMIN — PHYTONADIONE SCH: 10 INJECTION, EMULSION INTRAMUSCULAR; INTRAVENOUS; SUBCUTANEOUS at 07:00

## 2020-04-22 RX ADMIN — VANCOMYCIN HYDROCHLORIDE SCH MG: 250 CAPSULE ORAL at 09:07

## 2020-04-22 RX ADMIN — SODIUM CHLORIDE PRN MLS: 0.9 INJECTION, SOLUTION INTRAVENOUS at 05:12

## 2020-04-22 RX ADMIN — SODIUM CHLORIDE PRN MLS: 0.9 INJECTION, SOLUTION INTRAVENOUS at 00:27

## 2020-04-22 RX ADMIN — SODIUM CHLORIDE SCH MLS: 0.45 INJECTION, SOLUTION INTRAVENOUS at 00:23

## 2020-04-22 NOTE — RAD REPORT
EXAM DESCRIPTION:  RAD - Abdomen 1 View ([facility]) - 4/22/2020 9:11 am

 

CLINICAL HISTORY:  colitis

Pain

 

COMPARISON:  Abdomen 1 View ([facility]) dated 4/19/2020

 

FINDINGS:  Examination is quite limited due to patient body habitus. The bowel gas pattern is non-obs
tructive. No evidence of free air or pneumatosis. No suspicious calcifications.

## 2020-04-22 NOTE — PN
Date of Progress Note:  04/21/2020



Subjective:  Patient was seen for followup in the morning.  He was on ventilator, lying in bed in ICU
.  Vital signs reviewed.  Intake output records reviewed.  Patient remains on IV fluid and vasopresso
r medications.



Objective:  HEENT:  Unremarkable except presence of endotracheal tube in mouth. 

Lungs:  Bilateral equal air entry.  Clear to auscultation 

Cardiac:  Heart sounds normal 

Abdomen:  Soft and distended.  Bowel sounds are very hypoactive. 

Extremities:  No leg edema.



Laboratory Data:  White count 78, hemoglobin 10.9, platelets 157.  INR 5.23.  Sodium 143, potassium 4
.2, chloride 107, bicarb 12, BUN 46, creatinine 5.38, glucose 136, SGOT 2069, SGPT 532.



Impression:  

1.Septic shock with multiorgan failure.

2.Clostridium difficile colitis.

3.Acute respiratory failure.

4.Disseminated intravascular coagulation.

5.Acute renal failure.



Plan:  We will go ahead and continue to follow with nephrologist, and pulmonologist.  Patient remains
 on bicarb drip, ventilator support to be managed by Dr. Biggs.  We will continue antibiotics per 
order.  Continue IV fluid and the patient's overall prognosis is very poor.  He remains on ventilator
 as he was intubated because of respiratory failure and continues to have almost no urine output exce
pt few cc's as noted.  We were planning to transfer him to [place] for higher level of care, but he i
s on 2 vasopressor medications and still has problem with hypotension, so Dr. Biggs and myself we 
both discussed details and we both have opinion that the patient is unstable for transfer, so at this
 point, we are not planning to transfer him and we will reassess his situation tomorrow.  Nephrologis
t will also reassess the situation tomorrow to see whether they can do dialysis at our facility or no
t depending on clinical condition.  I did reach out to 

patient's next of kin and all the details were discussed with him 2-3 different times throughout the 
day today.





MARY/MODL

DD:  04/22/2020 19:40:49Voice ID:  [id number]

DT:  04/22/2020 21:24:05Report ID:  [id number]

## 2020-04-22 NOTE — RAD REPORT
EXAM DESCRIPTION:  RAD - Chest Single View - 4/22/2020 6:06 am

 

CLINICAL HISTORY:  respi failure

Chest pain.

 

COMPARISON:  Chest Single View dated 4/20/2020; Chest Single View dated 4/20/2020; Chest Single View 
dated 4/19/2020; Abdomen 1 View ([facility]) dated 4/19/2020

 

FINDINGS:  Portable technique limits examination quality.

 

Tip of the ET tube is above the tony. Enteric tube extends into the upper abdomen. Mild asymmetric 
pulmonary opacities are present, unchanged. Heart is significantly enlarged.

## 2020-04-23 NOTE — EKG
Test Date:    2020-04-22               Test Time:    10:02:16

Technician:   DEANNA                                    

                                                     

MEASUREMENT RESULTS:                                       

Intervals:                                           

Rate:         116                                    

NJ:           206                                    

QRSD:         66                                     

QT:           304                                    

QTc:          422                                    

Axis:                                                

P:            52                                     

NJ:           206                                    

QRS:          0                                      

T:            102                                    

                                                     

INTERPRETIVE STATEMENTS:                                       

                                                     

Sinus tachycardia

Low voltage QRS

Septal infarct, age undetermined

Abnormal ECG

Compared to ECG 04/21/2020 12:49:26

Myocardial infarct finding now present

Ventricular premature complex(es) no longer present

T-wave abnormality no longer present



Electronically Signed On 04-23-20 17:31:57 CDT by Jamison Myers